# Patient Record
Sex: FEMALE | Race: WHITE | NOT HISPANIC OR LATINO | Employment: OTHER | URBAN - METROPOLITAN AREA
[De-identification: names, ages, dates, MRNs, and addresses within clinical notes are randomized per-mention and may not be internally consistent; named-entity substitution may affect disease eponyms.]

---

## 2018-10-04 ENCOUNTER — TRANSCRIBE ORDERS (OUTPATIENT)
Dept: ADMINISTRATIVE | Facility: HOSPITAL | Age: 58
End: 2018-10-04

## 2018-10-04 DIAGNOSIS — I73.9 CLAUDICATION (HCC): Primary | ICD-10-CM

## 2018-10-16 ENCOUNTER — HOSPITAL ENCOUNTER (OUTPATIENT)
Dept: CARDIOLOGY | Facility: HOSPITAL | Age: 58
Discharge: HOME | End: 2018-10-16
Attending: FAMILY MEDICINE | Admitting: FAMILY MEDICINE

## 2018-10-16 DIAGNOSIS — I73.9 CLAUDICATION (HCC): ICD-10-CM

## 2018-10-16 LAB
BH CV LOWER ARTERIAL LEFT ABI RATIO: 124
BH CV LOWER ARTERIAL LEFT DORSALIS PEDIS SYS MAX: 152 MMHG
BH CV LOWER ARTERIAL LEFT HIGH THIGH SYS MAX: 184 MMHG
BH CV LOWER ARTERIAL LEFT LOW THIGH SYS MAX: 165 MMHG
BH CV LOWER ARTERIAL LEFT POPLITEAL SYS MAX: 164 MMHG
BH CV LOWER ARTERIAL LEFT POST TIBIAL SYS MAX: 153 MMHG
BH CV LOWER ARTERIAL LEFT TBI RATIO: 1.01
BH CV LOWER ARTERIAL RIGHT ABI RATIO: 1.17
BH CV LOWER ARTERIAL RIGHT DORSALIS PEDIS SYS MAX: 144 MMHG
BH CV LOWER ARTERIAL RIGHT HIGH THIGH SYS MAX: 172 MMHG
BH CV LOWER ARTERIAL RIGHT LOW THIGH SYS MAX: 168 MMHG
BH CV LOWER ARTERIAL RIGHT POPLITEAL SYS MAX: 162 MMHG
BH CV LOWER ARTERIAL RIGHT POST TIBIAL SYS MAX: 144 MMHG
BH CV LOWER ARTERIAL RIGHT TBI RATIO: 1.02
UPPER ARTERIAL LEFT ARM BRACHIAL SYS MAX: 123 MMHG
UPPER ARTERIAL RIGHT ARM BRACHIAL SYS MAX: 123 MMHG

## 2018-10-16 PROCEDURE — 93923 UPR/LXTR ART STDY 3+ LVLS: CPT

## 2018-10-16 PROCEDURE — 93923 UPR/LXTR ART STDY 3+ LVLS: CPT | Performed by: INTERNAL MEDICINE

## 2019-12-23 ENCOUNTER — TRANSCRIBE ORDERS (OUTPATIENT)
Dept: ADMINISTRATIVE | Facility: HOSPITAL | Age: 59
End: 2019-12-23

## 2019-12-23 ENCOUNTER — HOSPITAL ENCOUNTER (OUTPATIENT)
Dept: GENERAL RADIOLOGY | Facility: HOSPITAL | Age: 59
Discharge: HOME | End: 2019-12-23
Admitting: NURSE PRACTITIONER

## 2019-12-23 DIAGNOSIS — N20.0 LEFT NEPHROLITHIASIS: Primary | ICD-10-CM

## 2019-12-23 DIAGNOSIS — N20.0 LEFT NEPHROLITHIASIS: ICD-10-CM

## 2019-12-23 PROCEDURE — 74018 RADEX ABDOMEN 1 VIEW: CPT

## 2020-05-11 ENCOUNTER — LAB (OUTPATIENT)
Dept: LAB | Facility: HOSPITAL | Age: 60
End: 2020-05-11

## 2020-05-11 ENCOUNTER — TRANSCRIBE ORDERS (OUTPATIENT)
Dept: LAB | Facility: HOSPITAL | Age: 60
End: 2020-05-11

## 2020-05-11 DIAGNOSIS — R94.6 NONSPECIFIC ABNORMAL RESULTS OF THYROID FUNCTION STUDY: ICD-10-CM

## 2020-05-11 DIAGNOSIS — R73.09 IMPAIRED GLUCOSE TOLERANCE TEST: ICD-10-CM

## 2020-05-11 DIAGNOSIS — R73.09 IMPAIRED GLUCOSE TOLERANCE TEST: Primary | ICD-10-CM

## 2020-05-11 LAB
ALBUMIN SERPL-MCNC: 4.5 G/DL (ref 3.5–5.2)
ALBUMIN/GLOB SERPL: 1.7 G/DL
ALP SERPL-CCNC: 68 U/L (ref 39–117)
ALT SERPL W P-5'-P-CCNC: 16 U/L (ref 1–33)
ANION GAP SERPL CALCULATED.3IONS-SCNC: 11.8 MMOL/L (ref 5–15)
AST SERPL-CCNC: 22 U/L (ref 1–32)
BILIRUB SERPL-MCNC: 0.4 MG/DL (ref 0.2–1.2)
BUN BLD-MCNC: 15 MG/DL (ref 6–20)
BUN/CREAT SERPL: 20.5 (ref 7–25)
CALCIUM SPEC-SCNC: 10.5 MG/DL (ref 8.6–10.5)
CHLORIDE SERPL-SCNC: 98 MMOL/L (ref 98–107)
CO2 SERPL-SCNC: 28.2 MMOL/L (ref 22–29)
CREAT BLD-MCNC: 0.73 MG/DL (ref 0.57–1)
GFR SERPL CREATININE-BSD FRML MDRD: 82 ML/MIN/1.73
GLOBULIN UR ELPH-MCNC: 2.6 GM/DL
GLUCOSE BLD-MCNC: 98 MG/DL (ref 65–99)
HBA1C MFR BLD: 5.4 % (ref 4.8–5.6)
POTASSIUM BLD-SCNC: 4.6 MMOL/L (ref 3.5–5.2)
PROT SERPL-MCNC: 7.1 G/DL (ref 6–8.5)
SODIUM BLD-SCNC: 138 MMOL/L (ref 136–145)
T4 FREE SERPL-MCNC: 1.18 NG/DL (ref 0.93–1.7)
TSH SERPL DL<=0.05 MIU/L-ACNC: 3.39 UIU/ML (ref 0.27–4.2)

## 2020-05-11 PROCEDURE — 36415 COLL VENOUS BLD VENIPUNCTURE: CPT

## 2020-05-11 PROCEDURE — 83036 HEMOGLOBIN GLYCOSYLATED A1C: CPT

## 2020-05-11 PROCEDURE — 84443 ASSAY THYROID STIM HORMONE: CPT

## 2020-05-11 PROCEDURE — 80053 COMPREHEN METABOLIC PANEL: CPT

## 2020-05-11 PROCEDURE — 84439 ASSAY OF FREE THYROXINE: CPT

## 2021-03-24 ENCOUNTER — OFFICE VISIT (OUTPATIENT)
Dept: ENDOCRINOLOGY | Facility: CLINIC | Age: 61
End: 2021-03-24

## 2021-03-24 VITALS
TEMPERATURE: 97.3 F | BODY MASS INDEX: 31.16 KG/M2 | HEART RATE: 76 BPM | OXYGEN SATURATION: 98 % | DIASTOLIC BLOOD PRESSURE: 78 MMHG | HEIGHT: 65 IN | WEIGHT: 187 LBS | SYSTOLIC BLOOD PRESSURE: 122 MMHG

## 2021-03-24 DIAGNOSIS — R73.03 PREDIABETES: ICD-10-CM

## 2021-03-24 DIAGNOSIS — R94.6 ABNORMAL THYROID FUNCTION TEST: Primary | ICD-10-CM

## 2021-03-24 PROCEDURE — 99203 OFFICE O/P NEW LOW 30 MIN: CPT | Performed by: INTERNAL MEDICINE

## 2021-03-24 RX ORDER — LISINOPRIL AND HYDROCHLOROTHIAZIDE 25; 20 MG/1; MG/1
1 TABLET ORAL DAILY
COMMUNITY
Start: 2021-01-22 | End: 2022-01-26

## 2021-03-24 RX ORDER — ASPIRIN 81 MG/1
81 TABLET ORAL DAILY
COMMUNITY

## 2021-03-24 RX ORDER — LINACLOTIDE 290 UG/1
290 CAPSULE, GELATIN COATED ORAL DAILY
COMMUNITY
Start: 2021-02-27

## 2021-03-24 RX ORDER — DULOXETIN HYDROCHLORIDE 60 MG/1
60 CAPSULE, DELAYED RELEASE ORAL DAILY
COMMUNITY
Start: 2021-02-25

## 2021-03-24 RX ORDER — SPIRONOLACTONE 50 MG/1
50 TABLET, FILM COATED ORAL DAILY
COMMUNITY
Start: 2021-03-06

## 2021-03-24 RX ORDER — DICLOFENAC SODIUM 75 MG/1
75 TABLET, DELAYED RELEASE ORAL 2 TIMES DAILY
COMMUNITY
End: 2021-03-24

## 2021-03-24 RX ORDER — DEXLANSOPRAZOLE 60 MG/1
60 CAPSULE, DELAYED RELEASE ORAL DAILY
COMMUNITY
Start: 2021-03-05

## 2021-03-24 NOTE — PROGRESS NOTES
"     Office Note      Date: 2021  Patient Name: Sarah Sanchez  MRN: [id number]  : 1960    Chief Complaint   Patient presents with   • Thyroid Problem       History of Present Illness:   Sarah Sanchez is a 60 y.o. female who presents for Thyroid Problem    It has been a year since her last visit. She isn't taking any thyroid meds. She denies any excess iodine intake. She hasn't noted any change in the size of her neck. She denies any compressive sxs. She denies any symptoms of hypo- or hyperthyroidism at this time, aside from some temperature dysregulation.     She isn't taking any DM meds. She hasn't had any episodes of hypoglycemia recently. She isn't doing FSBS.    She remains on biotin supplement daily. She remains on spironolactone.    She is no longer on WeightWatchers.  She has gained about 10 pounds recently.  She has started back to the gym.    Subjective      Review of Systems:   Review of Systems   Constitutional: Negative.    Cardiovascular: Negative.    Gastrointestinal: Negative.    Endocrine: Negative.        The following portions of the patient's history were reviewed and updated as appropriate: allergies, current medications, past family history, past medical history, past social history, past surgical history and problem list.    Objective     Visit Vitals  /78 (BP Location: Right arm, Patient Position: Sitting, Cuff Size: Adult)   Pulse 76   Temp 97.3 °F (36.3 °C) (Infrared)   Ht 165.1 cm (65\")   Wt 84.8 kg (187 lb)   SpO2 98%   BMI 31.12 kg/m²       Physical Exam:  Physical Exam  Constitutional:       Appearance: Normal appearance.   Neck:      Thyroid: No thyroid mass, thyromegaly or thyroid tenderness.   Lymphadenopathy:      Cervical: No cervical adenopathy.   Neurological:      Mental Status: She is alert.         Labs:    TSH  No results found for: TSHBASE     Free T4  Free T4   Date Value Ref Range Status   2020 1.18 0.93 - 1.70 ng/dL Final       T3  No " results found for: P8BSZCL      TPO  No results found for: THYROIDAB    TG AB  No results found for: THGAB    TG  No results found for: THYROGLB    CBC w/DIFF  No results found for: WBC, RBC, HGB, HCT, MCV, MCH, MCHC, RDW, RDWSD, MPV, PLT, NEUTRORELPCT, LYMPHORELPCT, MONORELPCT, EOSRELPCT, BASORELPCT, AUTOIGPER, NEUTROABS, LYMPHSABS, MONOSABS, EOSABS, BASOSABS, AUTOIGNUM, NRBC        Assessment / Plan      Assessment & Plan:  Diagnoses and all orders for this visit:    1. Abnormal thyroid function test (Primary)  Assessment & Plan:  She will stop biotin then get labs done several days later.    Orders:  -     T4, Free; Future  -     TSH; Future    2. Prediabetes  Assessment & Plan:  Will check A1c.  Continue to work on weight loss efforts.    Orders:  -     Hemoglobin A1c; Future  -     Comprehensive Metabolic Panel; Future      Return in about 1 year (around 3/24/2022) for Recheck with A1c, CMP, TSH, free T4.    Romero Gordon MD   03/24/2021

## 2021-03-29 ENCOUNTER — LAB (OUTPATIENT)
Dept: ENDOCRINOLOGY | Facility: CLINIC | Age: 61
End: 2021-03-29

## 2021-03-29 ENCOUNTER — LAB (OUTPATIENT)
Dept: LAB | Facility: HOSPITAL | Age: 61
End: 2021-03-29

## 2021-03-29 DIAGNOSIS — R94.6 ABNORMAL THYROID FUNCTION TEST: ICD-10-CM

## 2021-03-29 DIAGNOSIS — R73.03 PREDIABETES: ICD-10-CM

## 2021-03-29 LAB
ALBUMIN SERPL-MCNC: 4.4 G/DL (ref 3.5–5.2)
ALBUMIN/GLOB SERPL: 2.1 G/DL
ALP SERPL-CCNC: 82 U/L (ref 39–117)
ALT SERPL W P-5'-P-CCNC: 32 U/L (ref 1–33)
ANION GAP SERPL CALCULATED.3IONS-SCNC: 8 MMOL/L (ref 5–15)
AST SERPL-CCNC: 36 U/L (ref 1–32)
BILIRUB SERPL-MCNC: 0.2 MG/DL (ref 0–1.2)
BUN SERPL-MCNC: 17 MG/DL (ref 6–20)
BUN/CREAT SERPL: 20.5 (ref 7–25)
CALCIUM SPEC-SCNC: 9.5 MG/DL (ref 8.6–10.5)
CHLORIDE SERPL-SCNC: 102 MMOL/L (ref 98–107)
CO2 SERPL-SCNC: 28 MMOL/L (ref 22–29)
CREAT SERPL-MCNC: 0.83 MG/DL (ref 0.57–1)
GFR SERPL CREATININE-BSD FRML MDRD: 71 ML/MIN/1.73
GLOBULIN UR ELPH-MCNC: 2.1 GM/DL
GLUCOSE SERPL-MCNC: 93 MG/DL (ref 65–99)
HBA1C MFR BLD: 5.74 % (ref 4.8–5.6)
POTASSIUM SERPL-SCNC: 4.5 MMOL/L (ref 3.5–5.2)
PROT SERPL-MCNC: 6.5 G/DL (ref 6–8.5)
SODIUM SERPL-SCNC: 138 MMOL/L (ref 136–145)
T4 FREE SERPL-MCNC: 0.92 NG/DL (ref 0.93–1.7)
TSH SERPL DL<=0.05 MIU/L-ACNC: 6.13 UIU/ML (ref 0.27–4.2)

## 2021-03-29 PROCEDURE — 80053 COMPREHEN METABOLIC PANEL: CPT

## 2021-03-29 PROCEDURE — 84443 ASSAY THYROID STIM HORMONE: CPT

## 2021-03-29 PROCEDURE — 83036 HEMOGLOBIN GLYCOSYLATED A1C: CPT

## 2021-03-29 PROCEDURE — 84439 ASSAY OF FREE THYROXINE: CPT

## 2021-06-08 ENCOUNTER — LAB (OUTPATIENT)
Dept: LAB | Facility: HOSPITAL | Age: 61
End: 2021-06-08

## 2021-06-08 ENCOUNTER — OFFICE VISIT (OUTPATIENT)
Dept: ENDOCRINOLOGY | Facility: CLINIC | Age: 61
End: 2021-06-08

## 2021-06-08 VITALS
OXYGEN SATURATION: 99 % | HEIGHT: 65 IN | HEART RATE: 66 BPM | DIASTOLIC BLOOD PRESSURE: 74 MMHG | WEIGHT: 180 LBS | BODY MASS INDEX: 29.99 KG/M2 | SYSTOLIC BLOOD PRESSURE: 120 MMHG

## 2021-06-08 DIAGNOSIS — R94.6 ABNORMAL THYROID FUNCTION TEST: ICD-10-CM

## 2021-06-08 DIAGNOSIS — R73.03 PREDIABETES: ICD-10-CM

## 2021-06-08 DIAGNOSIS — R94.6 ABNORMAL THYROID FUNCTION TEST: Primary | ICD-10-CM

## 2021-06-08 LAB
ALBUMIN SERPL-MCNC: 4.5 G/DL (ref 3.5–5.2)
ALBUMIN/GLOB SERPL: 1.8 G/DL
ALP SERPL-CCNC: 80 U/L (ref 39–117)
ALT SERPL W P-5'-P-CCNC: 17 U/L (ref 1–33)
ANION GAP SERPL CALCULATED.3IONS-SCNC: 11.4 MMOL/L (ref 5–15)
AST SERPL-CCNC: 19 U/L (ref 1–32)
BILIRUB SERPL-MCNC: 0.3 MG/DL (ref 0–1.2)
BUN SERPL-MCNC: 15 MG/DL (ref 6–20)
BUN/CREAT SERPL: 20 (ref 7–25)
CALCIUM SPEC-SCNC: 9.7 MG/DL (ref 8.6–10.5)
CHLORIDE SERPL-SCNC: 100 MMOL/L (ref 98–107)
CO2 SERPL-SCNC: 27.6 MMOL/L (ref 22–29)
CREAT SERPL-MCNC: 0.75 MG/DL (ref 0.57–1)
GFR SERPL CREATININE-BSD FRML MDRD: 79 ML/MIN/1.73
GLOBULIN UR ELPH-MCNC: 2.5 GM/DL
GLUCOSE SERPL-MCNC: 92 MG/DL (ref 65–99)
HBA1C MFR BLD: 5.64 % (ref 4.8–5.6)
POTASSIUM SERPL-SCNC: 4.3 MMOL/L (ref 3.5–5.2)
PROT SERPL-MCNC: 7 G/DL (ref 6–8.5)
SODIUM SERPL-SCNC: 139 MMOL/L (ref 136–145)
T4 FREE SERPL-MCNC: 1.05 NG/DL (ref 0.93–1.7)
TSH SERPL DL<=0.05 MIU/L-ACNC: 4.5 UIU/ML (ref 0.27–4.2)

## 2021-06-08 PROCEDURE — 80053 COMPREHEN METABOLIC PANEL: CPT

## 2021-06-08 PROCEDURE — 84443 ASSAY THYROID STIM HORMONE: CPT

## 2021-06-08 PROCEDURE — 99213 OFFICE O/P EST LOW 20 MIN: CPT | Performed by: INTERNAL MEDICINE

## 2021-06-08 PROCEDURE — 84439 ASSAY OF FREE THYROXINE: CPT

## 2021-06-08 PROCEDURE — 83036 HEMOGLOBIN GLYCOSYLATED A1C: CPT

## 2021-06-08 RX ORDER — CALCIUM CARBONATE/VITAMIN D3 600 MG-20
1 TABLET ORAL DAILY
COMMUNITY
Start: 2019-01-01 | End: 2022-01-26

## 2021-06-08 RX ORDER — MULTIPLE VITAMINS W/ MINERALS TAB 9MG-400MCG
1 TAB ORAL DAILY
COMMUNITY
Start: 1986-01-01 | End: 2022-01-26

## 2021-06-08 NOTE — PROGRESS NOTES
"     Office Note      Date: 2021  Patient Name: Sarah Sanchez  MRN: [id number]  : 1960    Chief Complaint   Patient presents with   • Follow-up   • Thyroid Problem       History of Present Illness:   Sarah Sanchez is a 60 y.o. female who presents for Follow-up and Thyroid Problem    At the last visit the TSH was mildly elevated and free T4 was mildly low.  She isn't taking any thyroid meds. She denies any excess iodine intake. She hasn't noted any change in the size of her neck. She denies any compressive sxs. She denies any symptoms of hypo- or hyperthyroidism at this time, aside from some temperature dysregulation.      She isn't taking any DM meds. She hasn't had any episodes of hypoglycemia recently. She isn't doing FSBS.     She remains on biotin supplement daily but stopped it 2 weeks ago. She remains on spironolactone.     She has started back to the gym.  She has been able to lose some weight.  She has started using [employer] shira to help with weight loss.    She had basal cell cancer removed from left cheek.    Subjective      Review of Systems:   Review of Systems   Constitutional: Negative.    Cardiovascular: Negative.    Gastrointestinal: Negative.    Endocrine: Negative.        The following portions of the patient's history were reviewed and updated as appropriate: allergies, current medications, past family history, past medical history, past social history, past surgical history and problem list.    Objective     Visit Vitals  /74   Pulse 66   Ht 165.1 cm (65\")   Wt 81.6 kg (180 lb)   SpO2 99%   BMI 29.95 kg/m²       Physical Exam:  Physical Exam  Constitutional:       Appearance: Normal appearance.   Neck:      Thyroid: No thyroid mass, thyromegaly or thyroid tenderness.   Lymphadenopathy:      Cervical: No cervical adenopathy.   Neurological:      Mental Status: She is alert.         Labs:    TSH  No results found for: TSHBASE     Free T4  Free T4   Date Value Ref Range Status "   03/29/2021 0.92 (L) 0.93 - 1.70 ng/dL Final       T3  No results found for: G8PQVGN      TPO  No results found for: THYROIDAB    TG AB  No results found for: THGAB    TG  No results found for: THYROGLB    CBC w/DIFF  No results found for: WBC, RBC, HGB, HCT, MCV, MCH, MCHC, RDW, RDWSD, MPV, PLT, NEUTRORELPCT, LYMPHORELPCT, MONORELPCT, EOSRELPCT, BASORELPCT, AUTOIGPER, NEUTROABS, LYMPHSABS, MONOSABS, EOSABS, BASOSABS, AUTOIGNUM, NRBC        Assessment / Plan      Assessment & Plan:  Diagnoses and all orders for this visit:    1. Abnormal thyroid function test (Primary)  Assessment & Plan:  Check TFTs today.  Will send note about results.    Orders:  -     TSH; Future  -     T4, Free; Future    2. Prediabetes  Assessment & Plan:  Check A1c today.  Continue weight loss efforts.    Orders:  -     Hemoglobin A1c; Future  -     Comprehensive Metabolic Panel; Future      Return in about 3 months (around 9/8/2021) for Recheck with A1c, TSH, free T4.    Romero Gordon MD   06/08/2021

## 2021-10-25 ENCOUNTER — LAB (OUTPATIENT)
Dept: LAB | Facility: HOSPITAL | Age: 61
End: 2021-10-25

## 2021-10-25 ENCOUNTER — OFFICE VISIT (OUTPATIENT)
Dept: ENDOCRINOLOGY | Facility: CLINIC | Age: 61
End: 2021-10-25

## 2021-10-25 ENCOUNTER — DOCUMENTATION (OUTPATIENT)
Dept: DIABETES SERVICES | Facility: HOSPITAL | Age: 61
End: 2021-10-25

## 2021-10-25 VITALS
HEART RATE: 74 BPM | BODY MASS INDEX: 29.99 KG/M2 | SYSTOLIC BLOOD PRESSURE: 122 MMHG | OXYGEN SATURATION: 99 % | HEIGHT: 65 IN | DIASTOLIC BLOOD PRESSURE: 78 MMHG | WEIGHT: 180 LBS

## 2021-10-25 DIAGNOSIS — R94.6 ABNORMAL THYROID FUNCTION TEST: Primary | ICD-10-CM

## 2021-10-25 DIAGNOSIS — R73.03 PREDIABETES: ICD-10-CM

## 2021-10-25 DIAGNOSIS — R94.6 ABNORMAL THYROID FUNCTION TEST: ICD-10-CM

## 2021-10-25 LAB
HBA1C MFR BLD: 5.8 % (ref 4.8–5.6)
T4 FREE SERPL-MCNC: 1.27 NG/DL (ref 0.93–1.7)
TSH SERPL DL<=0.05 MIU/L-ACNC: 4.27 UIU/ML (ref 0.27–4.2)

## 2021-10-25 PROCEDURE — 84439 ASSAY OF FREE THYROXINE: CPT

## 2021-10-25 PROCEDURE — 83036 HEMOGLOBIN GLYCOSYLATED A1C: CPT

## 2021-10-25 PROCEDURE — 99214 OFFICE O/P EST MOD 30 MIN: CPT | Performed by: INTERNAL MEDICINE

## 2021-10-25 PROCEDURE — 84443 ASSAY THYROID STIM HORMONE: CPT

## 2021-10-25 RX ORDER — AMLODIPINE BESYLATE 5 MG/1
5 TABLET ORAL DAILY
COMMUNITY
Start: 2021-10-14

## 2021-10-25 RX ORDER — SEMAGLUTIDE 1.34 MG/ML
0.5 INJECTION, SOLUTION SUBCUTANEOUS WEEKLY
Qty: 1.5 ML | Refills: 5 | Status: SHIPPED | OUTPATIENT
Start: 2021-10-25 | End: 2023-02-07

## 2021-10-25 NOTE — ASSESSMENT & PLAN NOTE
Check A1c today.  Work on diet/exercise/weight loss.  We discussed a trial of ozempic today to see if the insurance covers this.

## 2021-10-25 NOTE — PLAN OF CARE
Sarah Sanchez was seen today for Ozempic teaching as a courtesy visit following her Endocrinology visit. We discussed Ozempic and how it works, side effects and administration. We discussed it was a weekly injection that starts at 0.25 for the first 4 weeks and at the 5th week, she will increase to 0.5. Patient was provided an Ozempic sample pack. She gave her first injection while in the office. She wrote the date on the enclosed card. She will call the office with any questions or concerns.

## 2021-10-25 NOTE — PROGRESS NOTES
"     Office Note      Date: 10/25/2021  Patient Name: Sarah Sanchez  MRN: [id number]  : 1960    Chief Complaint   Patient presents with   • Thyroid Problem       History of Present Illness:   Sarah Sanchez is a 61 y.o. female who presents for Thyroid Problem    She isn't taking any thyroid meds. She denies any excess iodine intake. She hasn't noted any change in the size of her neck. She denies any compressive sxs. She denies any symptoms of hypo- or hyperthyroidism at this time, aside from some temperature dysregulation.      She isn't taking any DM meds. She hasn't had any episodes of hypoglycemia recently. She isn't doing FSBS.  Her PCP prescribed wegovy to help with weight loss but it was too expensive.       She remains on biotin supplement daily. She remains on spironolactone.    She has been started on medication for Reynaud's phenomenon.  Her toes are still turning blue.  She is being referred to a rheumatologist.    Subjective      Review of Systems:   Review of Systems   Constitutional: Negative.    Cardiovascular: Negative.    Gastrointestinal: Negative.    Endocrine: Negative.        The following portions of the patient's history were reviewed and updated as appropriate: allergies, current medications, past family history, past medical history, past social history, past surgical history and problem list.    Objective     Visit Vitals  /78   Pulse 74   Ht 165.1 cm (65\")   Wt 81.6 kg (180 lb)   SpO2 99%   BMI 29.95 kg/m²       Physical Exam:  Physical Exam  Constitutional:       Appearance: Normal appearance.   Neurological:      Mental Status: She is alert.         Labs:    TSH  No results found for: TSHBASE     Free T4  Free T4   Date Value Ref Range Status   2021 1.05 0.93 - 1.70 ng/dL Final       T3  No results found for: E3DHVOH      TPO  No results found for: THYROIDAB    TG AB  No results found for: THGAB    TG  No results found for: THYROGLB    CBC w/DIFF  No results " found for: WBC, RBC, HGB, HCT, MCV, MCH, MCHC, RDW, RDWSD, MPV, PLT, NEUTRORELPCT, LYMPHORELPCT, MONORELPCT, EOSRELPCT, BASORELPCT, AUTOIGPER, NEUTROABS, LYMPHSABS, MONOSABS, EOSABS, BASOSABS, AUTOIGNUM, NRBC        Assessment / Plan      Assessment & Plan:  Diagnoses and all orders for this visit:    1. Abnormal thyroid function test (Primary)  Assessment & Plan:  Check TFTs today.    Orders:  -     TSH; Future  -     T4, Free; Future    2. Prediabetes  Assessment & Plan:  Check A1c today.  Work on diet/exercise/weight loss.  We discussed a trial of ozempic today to see if the insurance covers this.      Orders:  -     Hemoglobin A1c; Future    Other orders  -     Semaglutide,0.25 or 0.5MG/DOS, (Ozempic, 0.25 or 0.5 MG/DOSE,) 2 MG/1.5ML solution pen-injector; Inject 0.5 mg under the skin into the appropriate area as directed 1 (One) Time Per Week.  Dispense: 1.5 mL; Refill: 5      Return in about 3 months (around 1/25/2022) for Recheck with A1c, TSH, free T4, CMP.    Romero Gordon MD   10/25/2021

## 2021-10-27 ENCOUNTER — PRIOR AUTHORIZATION (OUTPATIENT)
Dept: ENDOCRINOLOGY | Facility: CLINIC | Age: 61
End: 2021-10-27

## 2021-10-27 NOTE — TELEPHONE ENCOUNTER
VEDA WANG (Key: [id number])  Rx #: [id number]  Ozempic (0.25 or 0.5 MG/DOSE) 2MG/1.5ML pen-injectors     Form  [facility] Electronic PA Form (2017 NCPDP)  Created  2 days ago  Sent to Plan  1 day ago  Plan Response  1 day ago  Submit Clinical Questions  1 day ago  Determination  Unfavorable  5 hours ago  Message from Plan  Your PA request has been denied. Additional information will be provided in the denial communication. (Message 1140) Your PA request has been denied. Additional information will be provided in the denial communication. (Message 1140)

## 2021-12-21 ENCOUNTER — TRANSCRIBE ORDERS (OUTPATIENT)
Dept: ADMINISTRATIVE | Facility: HOSPITAL | Age: 61
End: 2021-12-21

## 2021-12-21 DIAGNOSIS — R79.89 ELEVATED PLASMA METANEPHRINES: Primary | ICD-10-CM

## 2021-12-22 ENCOUNTER — HOSPITAL ENCOUNTER (OUTPATIENT)
Dept: CT IMAGING | Facility: HOSPITAL | Age: 61
Discharge: HOME | End: 2021-12-22
Admitting: INTERNAL MEDICINE

## 2021-12-22 DIAGNOSIS — R79.89 ELEVATED PLASMA METANEPHRINES: ICD-10-CM

## 2021-12-22 LAB — CREAT BLDA-MCNC: 0.8 MG/DL (ref 0.6–1.3)

## 2021-12-22 PROCEDURE — 25010000002 IOPAMIDOL 61 % SOLUTION: Performed by: INTERNAL MEDICINE

## 2021-12-22 PROCEDURE — 82565 ASSAY OF CREATININE: CPT

## 2021-12-22 PROCEDURE — 74177 CT ABD & PELVIS W/CONTRAST: CPT

## 2021-12-22 RX ADMIN — IOPAMIDOL 90 ML: 612 INJECTION, SOLUTION INTRAVENOUS at 16:18

## 2021-12-27 ENCOUNTER — OFFICE VISIT (OUTPATIENT)
Dept: ENDOCRINOLOGY | Facility: CLINIC | Age: 61
End: 2021-12-27

## 2021-12-27 VITALS
BODY MASS INDEX: 26.99 KG/M2 | OXYGEN SATURATION: 98 % | HEIGHT: 65 IN | DIASTOLIC BLOOD PRESSURE: 81 MMHG | HEART RATE: 82 BPM | WEIGHT: 162 LBS | SYSTOLIC BLOOD PRESSURE: 118 MMHG

## 2021-12-27 DIAGNOSIS — R73.03 PREDIABETES: Primary | ICD-10-CM

## 2021-12-27 DIAGNOSIS — R94.6 ABNORMAL THYROID FUNCTION TEST: ICD-10-CM

## 2021-12-27 PROCEDURE — 99214 OFFICE O/P EST MOD 30 MIN: CPT | Performed by: INTERNAL MEDICINE

## 2021-12-27 RX ORDER — BLOOD-GLUCOSE METER
KIT MISCELLANEOUS
COMMUNITY
Start: 2021-12-14 | End: 2023-02-07

## 2021-12-27 RX ORDER — AMLODIPINE BESYLATE 10 MG/1
TABLET ORAL
COMMUNITY
Start: 2021-10-25 | End: 2022-01-26

## 2021-12-27 RX ORDER — BLOOD SUGAR DIAGNOSTIC
STRIP MISCELLANEOUS
COMMUNITY
Start: 2021-12-13 | End: 2023-02-07

## 2021-12-27 RX ORDER — LANCETS
EACH MISCELLANEOUS
COMMUNITY
Start: 2021-12-17 | End: 2023-02-07

## 2021-12-27 NOTE — PROGRESS NOTES
"     Office Note      Date: 2021  Patient Name: Sarah Sanchez  MRN: [id number]  : 1960    Chief Complaint   Patient presents with   • Thyroid Problem       History of Present Illness:   Sarah Sanchez is a 61 year old woman.    She isn't taking any thyroid meds. She denies any excess iodine intake. She hasn't noted any change in the size of her neck. She denies any compressive sxs. She denies any symptoms of hypo- or hyperthyroidism at this time, aside from some temperature dysregulation.      She isn't taking any DM meds. She hasn't had any episodes of hypoglycemia recently. She isn't doing FSBS.  She has been taking ozempic 0.25mg q week.   She stopped the biotin. She remains on spironolactone.    She saw Dr Benz recently.  They did labs.  The normetanephrine was elevated.  Metanephrine was normal.  She had CT that apparently showed normal adrenals.      She is on betablocker for Raynaud's.      She has been experiencing waves of nausea and vomiting.  This predates the ozempic.    Subjective     Review of Systems:   Review of Systems   Constitutional: Negative.    Cardiovascular: Negative.    Gastrointestinal: Positive for nausea and vomiting.   Endocrine: Positive for heat intolerance.       The following portions of the patient's history were reviewed and updated as appropriate: allergies, current medications, past family history, past medical history, past social history, past surgical history and problem list.    Objective       Visit Vitals  /81   Pulse 82   Ht 165.1 cm (65\")   Wt 73.5 kg (162 lb)   SpO2 98%   BMI 26.96 kg/m²       Physical Exam:  Physical Exam  Constitutional:       Appearance: Normal appearance.   Neurological:      Mental Status: She is alert.         Labs:    HbA1c  Lab Results   Component Value Date    HGBA1C 5.80 (H) 10/25/2021       CMP  Lab Results   Component Value Date    GLUCOSE 92 2021    BUN 15 2021    CREATININE 0.80 2021    EGFRIFNONA " 79 06/08/2021    BCR 20.0 06/08/2021    K 4.3 06/08/2021    CO2 27.6 06/08/2021    CALCIUM 9.7 06/08/2021    AST 19 06/08/2021    ALT 17 06/08/2021        Lipid Panel        TSH  Lab Results   Component Value Date    TSH 4.270 (H) 10/25/2021    FREET4 1.27 10/25/2021        Hemoglobin A1C  Lab Results   Component Value Date    HGBA1C 5.80 (H) 10/25/2021        Microalbumin/Creatinine  No results found for: MALBCRERATIO, CREATINIURIN, MICROALBUR        Assessment / Plan      Assessment & Plan:  Diagnoses and all orders for this visit:    1. Prediabetes (Primary)  Assessment & Plan:  Last A1c okay.  Continue ozempic but stay on lower dose since she has n/v.  We discussed seeing GI again about the n/v.      2. Abnormal thyroid function test  Assessment & Plan:  Recent TSH was again mildly elevated.  Free T4 is normal.  No goiter on exam.  Continue observation for now.        Return in about 3 months (around 3/27/2022) for Recheck with A1c, TSH, free T4, CMP.    Romero Gordon MD   12/27/2021

## 2021-12-27 NOTE — ASSESSMENT & PLAN NOTE
Last A1c okay.  Continue ozempic but stay on lower dose since she has n/v.  We discussed seeing GI again about the n/v.

## 2021-12-27 NOTE — ASSESSMENT & PLAN NOTE
Recent TSH was again mildly elevated.  Free T4 is normal.  No goiter on exam.  Continue observation for now.

## 2022-01-26 ENCOUNTER — OFFICE VISIT (OUTPATIENT)
Dept: CARDIOLOGY | Facility: CLINIC | Age: 62
End: 2022-01-26

## 2022-01-26 VITALS — HEIGHT: 65 IN | BODY MASS INDEX: 26.66 KG/M2 | OXYGEN SATURATION: 96 % | WEIGHT: 160 LBS

## 2022-01-26 DIAGNOSIS — R07.2 PRECORDIAL PAIN: ICD-10-CM

## 2022-01-26 DIAGNOSIS — R42 DIZZINESS: Primary | ICD-10-CM

## 2022-01-26 PROCEDURE — 93000 ELECTROCARDIOGRAM COMPLETE: CPT | Performed by: INTERNAL MEDICINE

## 2022-01-26 PROCEDURE — 99204 OFFICE O/P NEW MOD 45 MIN: CPT | Performed by: INTERNAL MEDICINE

## 2022-01-26 NOTE — PROGRESS NOTES
[facility] Cardiology  Consultation H&P  Sarah Sanchez  1960  [phone]  There is no work phone number on file..    VISIT DATE:  01/26/2022    PCP: [facility]z [facility]  [id number] [address] [place] [id number]    CC:  Chief Complaint   Patient presents with   • Chest Pain     consult   • Shortness of Breath   • Dizziness   • Migraine   • Nausea       ASSESSMENT:   Diagnosis Plan   1. Dizziness  Adult Transthoracic Echo Complete W/ Cont if Necessary Per Protocol    Tilt Table   2. Precordial pain  Stress Test With Myocardial Perfusion (1 Day)         PLAN:  -Transthoracic echocardiogram to assess underlying myocardial structure and function  -Tilt table test  -Exercise myocardial perfusion imaging for ischemia evaluation  -Wean off lisinopril hydrochlorothiazide.  Continue keep home blood pressure log.  Avoid dehydration.  Significant orthostatic hypotension only occurs in about 1% of patients on Ozempic.    History of Present Illness   61-year-old female presenting with recurrent presyncope, exertional chest discomfort and nausea over the previous 3 to 4 months.  She was started on Ozempic back in August and has lost about 30 pounds.  The previous 2 months she has had episodes of exertional nausea and vomiting.  Also with episodes of tachypalpitations.  Intermittently has a sensation of chest heaviness which can intermittently be associated with exertion in a class II pattern.  Has episodes in which she feels lightheaded if she stands up from a seated position or if she is standing for prolonged period of time in the same position.  She has had episodes of presyncope but no nicola syncope.  Blood pressures currently running less than 120/80 mmHg home.  Systolic blood pressures running in the high 90s today.  Blood pressures were stable during orthostatics but there was a 22 bpm increase in her baseline heart rate when compared with lying and standing.  She reports  "keeping up with her fluid intake.  Free normetanephrine was elevated at 534 pg/mL.  Follow-up CT abdomen pelvis was negative for adrenal adenoma.  She has had an unremarkable 24-hour Holter monitor.    PHYSICAL EXAMINATION:  Vitals:    01/26/22 1509 01/26/22 1517 01/26/22 1518   SpO2: 99% 98% 96%   Weight: 72.6 kg (160 lb)     Height: 165.1 cm (65\")       General Appearance:    Alert, cooperative, no distress, appears stated age   Head:    Normocephalic, without obvious abnormality, atraumatic   Eyes:    conjunctiva/corneas clear, EOM's intact, fundi     benign, both eyes   Ears:    Normal TM's and external ear canals, both ears   Nose:   Nares normal, septum midline, mucosa normal, no drainage    or sinus tenderness   Throat:   Lips, mucosa, and tongue normal; teeth and gums normal   Neck:   Supple, symmetrical, trachea midline, no adenopathy;     thyroid:  no enlargement/tenderness/nodules; no carotid    bruit or JVD   Back:     Symmetric, no curvature, ROM normal, no CVA tenderness   Lungs:     Clear to auscultation bilaterally, respirations unlabored   Chest Wall:    No tenderness or deformity    Heart:    Regular rate and rhythm, S1 and S2 normal, no murmur, rub   or gallop, normal carotid impulse bilaterally without bruit.   Abdomen:     Soft, non-tender, bowel sounds active all four quadrants,     no masses, no organomegaly   Extremities:   Extremities normal, atraumatic, no cyanosis or edema   Pulses:   2+ and symmetric all extremities   Skin:   Skin color, texture, turgor normal, no rashes or lesions   Lymph nodes:   Cervical, supraclavicular, and axillary nodes normal   Neurologic:   normal strength, sensation intact     throughout       Diagnostic Data:    ECG 12 Lead    Date/Time: 1/26/2022 3:24 PM  Performed by: Tanmay Sargent III, MD  Authorized by: Tanmay Sargent III, MD   Comparison: not compared with previous ECG   Previous ECG: no previous ECG available  Rhythm: sinus rhythm  Other findings: low " voltage and poor R wave progression    Clinical impression: abnormal EKG          No results found for: CHLPL, TRIG, HDL, LDLDIRECT  Lab Results   Component Value Date    GLUCOSE 92 06/08/2021    BUN 15 06/08/2021    CREATININE 0.80 12/22/2021     06/08/2021    K 4.3 06/08/2021     06/08/2021    CO2 27.6 06/08/2021     Lab Results   Component Value Date    HGBA1C 5.80 (H) 10/25/2021     No results found for: WBC, HGB, HCT, PLT    PROBLEM LIST:  Patient Active Problem List   Diagnosis   • Prediabetes   • Abnormal thyroid function test       PAST MEDICAL HX  Past Medical History:   Diagnosis Date   • Cancer (HCC) 2020    Skin   • Hypertension 2016   • Impaired glucose tolerance    • Raised TSH level        Allergies  Allergies   Allergen Reactions   • Penicillins Rash       Current Medications    Current Outpatient Medications:   •  amLODIPine (NORVASC) 5 MG tablet, 5 mg Daily., Disp: , Rfl:   •  aspirin 81 MG EC tablet, Take 81 mg by mouth Daily., Disp: , Rfl:   •  Blood Glucose Monitoring Suppl (OneTouch Verio Reflect) w/Device kit, , Disp: , Rfl:   •  Dexilant 60 MG capsule, 60 mg Daily., Disp: , Rfl:   •  DULoxetine (CYMBALTA) 60 MG capsule, 60 mg Daily., Disp: , Rfl:   •  Lancets (onetouch ultrasoft) lancets, , Disp: , Rfl:   •  Linzess 290 MCG capsule capsule, 290 mcg Daily., Disp: , Rfl:   •  OneTouch Verio test strip, , Disp: , Rfl:   •  Semaglutide,0.25 or 0.5MG/DOS, (Ozempic, 0.25 or 0.5 MG/DOSE,) 2 MG/1.5ML solution pen-injector, Inject 0.5 mg under the skin into the appropriate area as directed 1 (One) Time Per Week., Disp: 1.5 mL, Rfl: 5  •  spironolactone (ALDACTONE) 50 MG tablet, 50 mg Daily., Disp: , Rfl:          ROS  ROS  All other body systems reviewed and are negative    SOCIAL HX  Social History     Socioeconomic History   • Marital status:    Tobacco Use   • Smoking status: Never Smoker   • Smokeless tobacco: Never Used   Vaping Use   • Vaping Use: Never used   Substance and  Sexual Activity   • Alcohol use: Yes     Alcohol/week: 1.0 standard drink     Types: 1 Glasses of wine per week     Comment: Monthly if that   • Drug use: Never   • Sexual activity: Not Currently     Partners: Male     Birth control/protection: Condom     Comment: Hysterectomy       FAMILY HX  Family History   Problem Relation Age of Onset   • Cancer Mother    • Hypertension Father            • Heart disease Father    • Heart attack Father            • Diabetes Brother    • Hypertension Brother    • Obesity Brother    • Diabetes Brother    • Hypertension Brother              Tanmay Sargent III, MD, FACC

## 2022-03-03 ENCOUNTER — DOCUMENTATION (OUTPATIENT)
Dept: ENDOCRINOLOGY | Facility: CLINIC | Age: 62
End: 2022-03-03

## 2022-03-03 NOTE — PROGRESS NOTES
Patient is eligible to fill with [facility].    [facility]  Ozempic-needs PA she's been taking samples- applied for PA on 3/3    April Jatin Mac  Pharmacy Care Coordinator  3/3/2022  12:25 EST

## 2022-03-04 ENCOUNTER — HOSPITAL ENCOUNTER (OUTPATIENT)
Dept: CARDIOLOGY | Facility: HOSPITAL | Age: 62
Discharge: HOME | End: 2022-03-04

## 2022-03-04 VITALS
SYSTOLIC BLOOD PRESSURE: 124 MMHG | HEIGHT: 65 IN | BODY MASS INDEX: 25.82 KG/M2 | HEART RATE: 66 BPM | DIASTOLIC BLOOD PRESSURE: 84 MMHG | WEIGHT: 154.98 LBS

## 2022-03-04 VITALS
BODY MASS INDEX: 25.83 KG/M2 | HEIGHT: 65 IN | DIASTOLIC BLOOD PRESSURE: 85 MMHG | SYSTOLIC BLOOD PRESSURE: 116 MMHG | WEIGHT: 155 LBS

## 2022-03-04 VITALS
WEIGHT: 155 LBS | BODY MASS INDEX: 25.83 KG/M2 | HEART RATE: 67 BPM | HEIGHT: 65 IN | SYSTOLIC BLOOD PRESSURE: 100 MMHG | DIASTOLIC BLOOD PRESSURE: 74 MMHG

## 2022-03-04 DIAGNOSIS — R42 DIZZINESS: ICD-10-CM

## 2022-03-04 DIAGNOSIS — R07.2 PRECORDIAL PAIN: ICD-10-CM

## 2022-03-04 LAB
BH CV ECHO MEAS - AI DEC SLOPE: 180.4 CM/SEC^2
BH CV ECHO MEAS - AI MAX PG: 56.3 MMHG
BH CV ECHO MEAS - AI MAX VEL: 375.1 CM/SEC
BH CV ECHO MEAS - AI P1/2T: 608.9 MSEC
BH CV ECHO MEAS - AO MAX PG (FULL): 1 MMHG
BH CV ECHO MEAS - AO MAX PG: 5.1 MMHG
BH CV ECHO MEAS - AO MEAN PG (FULL): 0.97 MMHG
BH CV ECHO MEAS - AO MEAN PG: 2.7 MMHG
BH CV ECHO MEAS - AO ROOT AREA (BSA CORRECTED): 2.1
BH CV ECHO MEAS - AO ROOT AREA: 11.2 CM^2
BH CV ECHO MEAS - AO ROOT DIAM: 3.8 CM
BH CV ECHO MEAS - AO V2 MAX: 113.2 CM/SEC
BH CV ECHO MEAS - AO V2 MEAN: 76.6 CM/SEC
BH CV ECHO MEAS - AO V2 VTI: 24.1 CM
BH CV ECHO MEAS - ASC AORTA: 2.8 CM
BH CV ECHO MEAS - AVA(I,A): 2.9 CM^2
BH CV ECHO MEAS - AVA(I,D): 2.9 CM^2
BH CV ECHO MEAS - AVA(V,A): 2.9 CM^2
BH CV ECHO MEAS - AVA(V,D): 2.9 CM^2
BH CV ECHO MEAS - BSA(HAYCOCK): 1.8 M^2
BH CV ECHO MEAS - BSA: 1.8 M^2
BH CV ECHO MEAS - BZI_BMI: 25.8 KILOGRAMS/M^2
BH CV ECHO MEAS - BZI_METRIC_HEIGHT: 165.1 CM
BH CV ECHO MEAS - BZI_METRIC_WEIGHT: 70.3 KG
BH CV ECHO MEAS - EDV(CUBED): 59.3 ML
BH CV ECHO MEAS - EDV(MOD-SP2): 65 ML
BH CV ECHO MEAS - EDV(MOD-SP4): 69 ML
BH CV ECHO MEAS - EDV(TEICH): 65.9 ML
BH CV ECHO MEAS - EF(CUBED): 86.2 %
BH CV ECHO MEAS - EF(MOD-BP): 66 %
BH CV ECHO MEAS - EF(MOD-SP2): 61.5 %
BH CV ECHO MEAS - EF(MOD-SP4): 69.6 %
BH CV ECHO MEAS - EF(TEICH): 80.3 %
BH CV ECHO MEAS - ESV(CUBED): 8.2 ML
BH CV ECHO MEAS - ESV(MOD-SP2): 25 ML
BH CV ECHO MEAS - ESV(MOD-SP4): 21 ML
BH CV ECHO MEAS - ESV(TEICH): 13 ML
BH CV ECHO MEAS - FS: 48.3 %
BH CV ECHO MEAS - IVS/LVPW: 1
BH CV ECHO MEAS - IVSD: 1.1 CM
BH CV ECHO MEAS - LA DIMENSION: 2.5 CM
BH CV ECHO MEAS - LA/AO: 0.67
BH CV ECHO MEAS - LAD MAJOR: 4.7 CM
BH CV ECHO MEAS - LAT PEAK E' VEL: 12.3 CM/SEC
BH CV ECHO MEAS - LATERAL E/E' RATIO: 6.8
BH CV ECHO MEAS - LV DIASTOLIC VOL/BSA (35-75): 38.9 ML/M^2
BH CV ECHO MEAS - LV MASS(C)D: 130.4 GRAMS
BH CV ECHO MEAS - LV MASS(C)DI: 73.5 GRAMS/M^2
BH CV ECHO MEAS - LV MAX PG: 4.1 MMHG
BH CV ECHO MEAS - LV MEAN PG: 1.8 MMHG
BH CV ECHO MEAS - LV SYSTOLIC VOL/BSA (12-30): 11.8 ML/M^2
BH CV ECHO MEAS - LV V1 MAX: 101 CM/SEC
BH CV ECHO MEAS - LV V1 MEAN: 59.4 CM/SEC
BH CV ECHO MEAS - LV V1 VTI: 21.5 CM
BH CV ECHO MEAS - LVIDD: 3.9 CM
BH CV ECHO MEAS - LVIDS: 2 CM
BH CV ECHO MEAS - LVLD AP2: 7 CM
BH CV ECHO MEAS - LVLD AP4: 7.2 CM
BH CV ECHO MEAS - LVLS AP2: 5.6 CM
BH CV ECHO MEAS - LVLS AP4: 5.7 CM
BH CV ECHO MEAS - LVOT AREA (M): 3.1 CM^2
BH CV ECHO MEAS - LVOT AREA: 3.3 CM^2
BH CV ECHO MEAS - LVOT DIAM: 2 CM
BH CV ECHO MEAS - LVPWD: 1 CM
BH CV ECHO MEAS - MED PEAK E' VEL: 8.8 CM/SEC
BH CV ECHO MEAS - MEDIAL E/E' RATIO: 9.4
BH CV ECHO MEAS - MR MAX PG: 91 MMHG
BH CV ECHO MEAS - MR MAX VEL: 473.5 CM/SEC
BH CV ECHO MEAS - MV A MAX VEL: 71 CM/SEC
BH CV ECHO MEAS - MV DEC SLOPE: 336.1 CM/SEC^2
BH CV ECHO MEAS - MV DEC TIME: 0.18 SEC
BH CV ECHO MEAS - MV E MAX VEL: 84.2 CM/SEC
BH CV ECHO MEAS - MV E/A: 1.2
BH CV ECHO MEAS - MV P1/2T MAX VEL: 92.9 CM/SEC
BH CV ECHO MEAS - MV P1/2T: 81 MSEC
BH CV ECHO MEAS - MVA P1/2T LCG: 2.4 CM^2
BH CV ECHO MEAS - MVA(P1/2T): 2.7 CM^2
BH CV ECHO MEAS - PA ACC SLOPE: 513.2 CM/SEC^2
BH CV ECHO MEAS - PA ACC TIME: 0.11 SEC
BH CV ECHO MEAS - PA MAX PG: 3.3 MMHG
BH CV ECHO MEAS - PA PR(ACCEL): 29.1 MMHG
BH CV ECHO MEAS - PA V2 MAX: 89.9 CM/SEC
BH CV ECHO MEAS - PULM A REVS VEL: 30.1 CM/SEC
BH CV ECHO MEAS - PULM DIAS VEL: 50.9 CM/SEC
BH CV ECHO MEAS - PULM S/D: 1.1
BH CV ECHO MEAS - PULM SYS VEL: 55.9 CM/SEC
BH CV ECHO MEAS - RAP SYSTOLE: 3 MMHG
BH CV ECHO MEAS - RVSP: 22 MMHG
BH CV ECHO MEAS - SI(AO): 151.5 ML/M^2
BH CV ECHO MEAS - SI(CUBED): 28.8 ML/M^2
BH CV ECHO MEAS - SI(LVOT): 39.7 ML/M^2
BH CV ECHO MEAS - SI(MOD-SP2): 22.5 ML/M^2
BH CV ECHO MEAS - SI(MOD-SP4): 27 ML/M^2
BH CV ECHO MEAS - SI(TEICH): 29.8 ML/M^2
BH CV ECHO MEAS - SV(AO): 268.9 ML
BH CV ECHO MEAS - SV(CUBED): 51.1 ML
BH CV ECHO MEAS - SV(LVOT): 70.4 ML
BH CV ECHO MEAS - SV(MOD-SP2): 40 ML
BH CV ECHO MEAS - SV(MOD-SP4): 48 ML
BH CV ECHO MEAS - SV(TEICH): 52.9 ML
BH CV ECHO MEAS - TAPSE (>1.6): 2.4 CM
BH CV ECHO MEAS - TR MAX PG: 19 MMHG
BH CV ECHO MEAS - TR MAX VEL: 194.2 CM/SEC
BH CV ECHO MEASUREMENTS AVERAGE E/E' RATIO: 7.98
BH CV REST NUCLEAR ISOTOPE DOSE: 9.7 MCI
BH CV STRESS BP STAGE 1: NORMAL
BH CV STRESS BP STAGE 2: NORMAL
BH CV STRESS DURATION MIN STAGE 1: 3
BH CV STRESS DURATION MIN STAGE 2: 3
BH CV STRESS DURATION MIN STAGE 3: 1
BH CV STRESS DURATION SEC STAGE 1: 0
BH CV STRESS DURATION SEC STAGE 2: 0
BH CV STRESS DURATION SEC STAGE 3: 37
BH CV STRESS GRADE STAGE 1: 10
BH CV STRESS GRADE STAGE 2: 12
BH CV STRESS GRADE STAGE 3: 14
BH CV STRESS HR STAGE 1: 115
BH CV STRESS HR STAGE 2: 139
BH CV STRESS HR STAGE 3: 153
BH CV STRESS METS STAGE 1: 5
BH CV STRESS METS STAGE 2: 7.5
BH CV STRESS METS STAGE 3: 10
BH CV STRESS NUCLEAR ISOTOPE DOSE: 33 MCI
BH CV STRESS O2 STAGE 1: 98
BH CV STRESS O2 STAGE 2: 98
BH CV STRESS O2 STAGE 3: 97
BH CV STRESS PROTOCOL 1: NORMAL
BH CV STRESS RECOVERY BP: NORMAL MMHG
BH CV STRESS RECOVERY HR: 87 BPM
BH CV STRESS RECOVERY O2: 98 %
BH CV STRESS SPEED STAGE 1: 1.7
BH CV STRESS SPEED STAGE 2: 2.5
BH CV STRESS SPEED STAGE 3: 3.4
BH CV STRESS STAGE 1: 1
BH CV STRESS STAGE 2: 2
BH CV STRESS STAGE 3: 3
BH CV XLRA - RV BASE: 3.1 CM
BH CV XLRA - RV LENGTH: 5.8 CM
BH CV XLRA - RV MID: 2.6 CM
LEFT ATRIUM VOLUME INDEX: 25.4 ML/M^2
LEFT ATRIUM VOLUME: 45 ML
LV EF NUC BP: 83 %
MAXIMAL PREDICTED HEART RATE: 161 BPM
PERCENT MAX PREDICTED HR: 95.03 %
STRESS BASELINE BP: NORMAL MMHG
STRESS BASELINE HR: 60 BPM
STRESS O2 SAT REST: 99 %
STRESS PERCENT HR: 112 %
STRESS POST ESTIMATED WORKLOAD: 9.3 METS
STRESS POST EXERCISE DUR MIN: 7 MIN
STRESS POST EXERCISE DUR SEC: 37 SEC
STRESS POST O2 SAT PEAK: 97 %
STRESS POST PEAK BP: NORMAL MMHG
STRESS POST PEAK HR: 153 BPM
STRESS TARGET HR: 137 BPM

## 2022-03-04 PROCEDURE — 93018 CV STRESS TEST I&R ONLY: CPT | Performed by: INTERNAL MEDICINE

## 2022-03-04 PROCEDURE — 78452 HT MUSCLE IMAGE SPECT MULT: CPT | Performed by: INTERNAL MEDICINE

## 2022-03-04 PROCEDURE — 93306 TTE W/DOPPLER COMPLETE: CPT | Performed by: INTERNAL MEDICINE

## 2022-03-04 PROCEDURE — 93660 TILT TABLE EVALUATION: CPT

## 2022-03-04 PROCEDURE — 93306 TTE W/DOPPLER COMPLETE: CPT

## 2022-03-04 PROCEDURE — 0 TECHNETIUM SESTAMIBI: Performed by: INTERNAL MEDICINE

## 2022-03-04 PROCEDURE — A9500 TC99M SESTAMIBI: HCPCS | Performed by: INTERNAL MEDICINE

## 2022-03-04 PROCEDURE — 78452 HT MUSCLE IMAGE SPECT MULT: CPT

## 2022-03-04 PROCEDURE — 93660 TILT TABLE EVALUATION: CPT | Performed by: INTERNAL MEDICINE

## 2022-03-04 PROCEDURE — 93017 CV STRESS TEST TRACING ONLY: CPT

## 2022-03-04 RX ADMIN — TECHNETIUM TC 99M SESTAMIBI 1 DOSE: 1 INJECTION INTRAVENOUS at 08:55

## 2022-03-04 RX ADMIN — TECHNETIUM TC 99M SESTAMIBI 1 DOSE: 1 INJECTION INTRAVENOUS at 10:50

## 2022-03-07 ENCOUNTER — TELEPHONE (OUTPATIENT)
Dept: CARDIOLOGY | Facility: CLINIC | Age: 62
End: 2022-03-07

## 2022-03-07 RX ORDER — HYDROCHLOROTHIAZIDE 25 MG/1
25 TABLET ORAL DAILY
Qty: 30 TABLET | Refills: 3 | Status: SHIPPED | OUTPATIENT
Start: 2022-03-07 | End: 2022-07-05

## 2022-03-07 NOTE — TELEPHONE ENCOUNTER
----- Message from Tanmay Sargent III, MD sent at 3/7/2022 11:31 AM EST -----  Normal stress test, no concerning abnormalities noted on echo.

## 2022-03-07 NOTE — TELEPHONE ENCOUNTER
Notified of message above from . Wanted you to know that since you stopped her Lisinopril-HCTZ 20-25 mg that her hands and feet have been swollen. B/P has been averaging @ 119/72. No other symptoms reported.Please advise.

## 2022-03-25 ENCOUNTER — LAB (OUTPATIENT)
Dept: LAB | Facility: HOSPITAL | Age: 62
End: 2022-03-25

## 2022-03-25 ENCOUNTER — OFFICE VISIT (OUTPATIENT)
Dept: ENDOCRINOLOGY | Facility: CLINIC | Age: 62
End: 2022-03-25

## 2022-03-25 VITALS
OXYGEN SATURATION: 96 % | HEART RATE: 70 BPM | DIASTOLIC BLOOD PRESSURE: 68 MMHG | HEIGHT: 65 IN | WEIGHT: 150 LBS | BODY MASS INDEX: 24.99 KG/M2 | SYSTOLIC BLOOD PRESSURE: 122 MMHG

## 2022-03-25 DIAGNOSIS — R73.03 PREDIABETES: Primary | ICD-10-CM

## 2022-03-25 DIAGNOSIS — R94.6 ABNORMAL THYROID FUNCTION TEST: ICD-10-CM

## 2022-03-25 DIAGNOSIS — R73.03 PREDIABETES: ICD-10-CM

## 2022-03-25 LAB
ALBUMIN SERPL-MCNC: 5.1 G/DL (ref 3.5–5.2)
ALBUMIN/GLOB SERPL: 2.1 G/DL
ALP SERPL-CCNC: 59 U/L (ref 39–117)
ALT SERPL W P-5'-P-CCNC: 20 U/L (ref 1–33)
ANION GAP SERPL CALCULATED.3IONS-SCNC: 14 MMOL/L (ref 5–15)
AST SERPL-CCNC: 19 U/L (ref 1–32)
BILIRUB SERPL-MCNC: 0.3 MG/DL (ref 0–1.2)
BUN SERPL-MCNC: 20 MG/DL (ref 6–20)
BUN/CREAT SERPL: 24.7 (ref 7–25)
CALCIUM SPEC-SCNC: 10.4 MG/DL (ref 8.6–10.5)
CHLORIDE SERPL-SCNC: 96 MMOL/L (ref 98–107)
CO2 SERPL-SCNC: 28 MMOL/L (ref 22–29)
CREAT SERPL-MCNC: 0.81 MG/DL (ref 0.57–1)
EGFRCR SERPLBLD CKD-EPI 2021: 83.7 ML/MIN/1.73
EXPIRATION DATE: NORMAL
EXPIRATION DATE: NORMAL
GLOBULIN UR ELPH-MCNC: 2.4 GM/DL
GLUCOSE BLDC GLUCOMTR-MCNC: 89 MG/DL (ref 70–130)
GLUCOSE SERPL-MCNC: 62 MG/DL (ref 65–99)
HBA1C MFR BLD: 5 %
Lab: NORMAL
Lab: NORMAL
POTASSIUM SERPL-SCNC: 4 MMOL/L (ref 3.5–5.2)
PROT SERPL-MCNC: 7.5 G/DL (ref 6–8.5)
SODIUM SERPL-SCNC: 138 MMOL/L (ref 136–145)
T4 FREE SERPL-MCNC: 1.31 NG/DL (ref 0.93–1.7)
TSH SERPL DL<=0.05 MIU/L-ACNC: 2.56 UIU/ML (ref 0.27–4.2)

## 2022-03-25 PROCEDURE — 83036 HEMOGLOBIN GLYCOSYLATED A1C: CPT | Performed by: INTERNAL MEDICINE

## 2022-03-25 PROCEDURE — 84443 ASSAY THYROID STIM HORMONE: CPT

## 2022-03-25 PROCEDURE — 82947 ASSAY GLUCOSE BLOOD QUANT: CPT | Performed by: INTERNAL MEDICINE

## 2022-03-25 PROCEDURE — 80053 COMPREHEN METABOLIC PANEL: CPT

## 2022-03-25 PROCEDURE — 84439 ASSAY OF FREE THYROXINE: CPT

## 2022-03-25 PROCEDURE — 99213 OFFICE O/P EST LOW 20 MIN: CPT | Performed by: INTERNAL MEDICINE

## 2022-03-25 NOTE — PROGRESS NOTES
"     Office Note      Date: 2022  Patient Name: Sarah Sanchez  MRN: [id number]  : 1960    Chief Complaint   Patient presents with   • Prediabetes   • Thyroid Problem       History of Present Illness:   Sarah Sanchez is a 61 y.o. female who presents for Prediabetes and Thyroid Problem    She isn't taking any thyroid meds. She denies any excess iodine intake. She hasn't noted any change in the size of her neck. She denies any compressive sxs. She denies any symptoms of hypo- or hyperthyroidism at this time.  She isn't taking biotin.     She hasn't had any episodes of hypoglycemia recently. She isn't doing FSBS.  She has been taking ozempic 0.25mg q week.     She remains on spironolactone.  She saw the cardioligist.  W/u was unrevealing.  The lisinopril was stopped.  She notes much less n/v since then.      Subjective      Review of Systems:   Review of Systems   Constitutional: Negative.    Cardiovascular: Negative.    Gastrointestinal: Negative.    Endocrine: Negative.        The following portions of the patient's history were reviewed and updated as appropriate: allergies, current medications, past family history, past medical history, past social history, past surgical history and problem list.    Objective     Visit Vitals  /68 (BP Location: Left arm, Patient Position: Sitting, Cuff Size: Adult)   Pulse 70   Ht 165.1 cm (65\")   Wt 68 kg (150 lb)   SpO2 96%   BMI 24.96 kg/m²       Physical Exam:  Physical Exam  Constitutional:       Appearance: Normal appearance.   Neurological:      Mental Status: She is alert.         Labs:    TSH  No results found for: TSHBASE     Free T4  Free T4   Date Value Ref Range Status   10/25/2021 1.27 0.93 - 1.70 ng/dL Final       T3  No results found for: [id number]      TPO  No results found for: THYROIDAB    TG AB  No results found for: THGAB    TG  No results found for: THYROGLB    CBC w/DIFF  No results found for: WBC, RBC, HGB, HCT, MCV, MCH, MCHC, " RDW, RDWSD, MPV, PLT, NEUTRORELPCT, LYMPHORELPCT, MONORELPCT, EOSRELPCT, BASORELPCT, AUTOIGPER, NEUTROABS, LYMPHSABS, MONOSABS, EOSABS, BASOSABS, AUTOIGNUM, NRBC        Assessment / Plan      Assessment & Plan:  Diagnoses and all orders for this visit:    1. Prediabetes (Primary)  Assessment & Plan:  A1c looks good.  We discussed continued use of low dose ozempic.      Orders:  -     POC Glucose, Blood  -     POC Glycosylated Hemoglobin (Hb A1C)  -     Comprehensive Metabolic Panel; Future    2. Abnormal thyroid function test  Assessment & Plan:  Check TFTs today.    Orders:  -     TSH; Future  -     T4, Free; Future      Return in about 6 months (around 9/25/2022) for Recheck with A1c, TSH, free T4.    Romero Gordon MD   03/25/2022

## 2022-05-18 ENCOUNTER — OFFICE VISIT (OUTPATIENT)
Dept: CARDIOLOGY | Facility: CLINIC | Age: 62
End: 2022-05-18

## 2022-05-18 VITALS
HEIGHT: 65 IN | OXYGEN SATURATION: 98 % | SYSTOLIC BLOOD PRESSURE: 128 MMHG | WEIGHT: 146 LBS | BODY MASS INDEX: 24.32 KG/M2 | DIASTOLIC BLOOD PRESSURE: 76 MMHG | HEART RATE: 65 BPM

## 2022-05-18 DIAGNOSIS — I10 PRIMARY HYPERTENSION: Primary | ICD-10-CM

## 2022-05-18 PROCEDURE — 99213 OFFICE O/P EST LOW 20 MIN: CPT | Performed by: INTERNAL MEDICINE

## 2022-05-18 NOTE — PROGRESS NOTES
[facility] Cardiology  Office visit  Sarah Sanchez  1960  [phone]  There is no work phone number on file.    VISIT DATE:  5/18/2022    PCP: Kenya Benz MD  [address] [facility] [address] [place] [id number]    CC:  Chief Complaint   Patient presents with   • POTS       Previous cardiac studies and procedures:  March 2022  TTE  · Left ventricular ejection fraction appears to be 61 - 65%. Left ventricular systolic function is normal.  · Left ventricular diastolic function was normal.  · Left ventricular wall thickness is consistent with borderline concentric hypertrophy.  · Estimated right ventricular systolic pressure from tricuspid regurgitation is normal (<35 mmHg). Calculated right ventricular systolic pressure from tricuspid regurgitation is 22 mmHg.  Exercise myocardial perfusion imaging  · Patient denied any chest discomfort/pain, or any other symptoms during exercise.  · Expected exercise duration 7:00, actual 7:37.  · No ECG evidence of myocardial ischemia  · Left ventricular ejection fraction is hyperdynamic (Calculated EF > 70%). .  · Myocardial perfusion imaging indicates a normal myocardial perfusion study with no evidence of ischemia.        ASSESSMENT:   Diagnosis Plan   1. Primary hypertension         PLAN:  Goal blood pressure less than 130/80 mmHg.  Currently well controlled.  No further episodes of presyncope after down titration of medical therapy.  She reports some mild dependent pedal edema which is not affecting quality of life.  Reviewed results of recent cardiac evaluation which has been low risk in nature.  No further cardiac testing or medication titration indicated this time.  Patient will follow up on an as-needed basis, current cardiac risk factors appear to be well controlled.    Subjective  Interval assessment: Denies chest pain, palpitations, presyncope or syncope.  Blood pressures running less than 130/80 mmHg.  Pedal edema improved with  "down titration of amlodipine.    Initial evaluation: 61-year-old female presenting with recurrent presyncope, exertional chest discomfort and nausea over the previous 3 to 4 months.  She was started on Ozempic back in August and has lost about 30 pounds.  The previous 2 months she has had episodes of exertional nausea and vomiting.  Also with episodes of tachypalpitations.  Intermittently has a sensation of chest heaviness which can intermittently be associated with exertion in a class II pattern.  Has episodes in which she feels lightheaded if she stands up from a seated position or if she is standing for prolonged period of time in the same position.  She has had episodes of presyncope but no nicola syncope.  Blood pressures currently running less than 120/80 mmHg home.  Systolic blood pressures running in the high 90s today.  Blood pressures were stable during orthostatics but there was a 22 bpm increase in her baseline heart rate when compared with lying and standing.  She reports keeping up with her fluid intake.  Free normetanephrine was elevated at 534 pg/mL.  Follow-up CT abdomen pelvis was negative for adrenal adenoma.  She has had an unremarkable 24-hour Holter monitor.    PHYSICAL EXAMINATION:  Vitals:    05/18/22 1510   BP: 128/76   BP Location: Left arm   Patient Position: Sitting   Pulse: 65   SpO2: 98%   Weight: 66.2 kg (146 lb)   Height: 165.1 cm (65\")     General Appearance:    Alert, cooperative, no distress, appears stated age   Head:    Normocephalic, without obvious abnormality, atraumatic   Eyes:    conjunctiva/corneas clear   Nose:   Nares normal, septum midline, mucosa normal, no drainage   Throat:   Lips, teeth and gums normal   Neck:   Supple, symmetrical, trachea midline, no carotid    bruit or JVD   Lungs:     Clear to auscultation bilaterally, respirations unlabored   Chest Wall:    No tenderness or deformity    Heart:    Regular rate and rhythm, S1 and S2 normal, no murmur, rub   or " gallop, normal carotid impulse bilaterally without bruit.   Abdomen:     Soft, non-tender   Extremities:   Extremities normal, atraumatic, no cyanosis or edema   Pulses:   2+ and symmetric all extremities   Skin:   Skin color, texture, turgor normal, no rashes or lesions       Diagnostic Data:  Procedures  No results found for: CHLPL, TRIG, HDL, LDLDIRECT  Lab Results   Component Value Date    GLUCOSE 62 (L) 03/25/2022    BUN 20 03/25/2022    CREATININE 0.81 03/25/2022     03/25/2022    K 4.0 03/25/2022    CL 96 (L) 03/25/2022    CO2 28.0 03/25/2022     Lab Results   Component Value Date    HGBA1C 5.0 03/25/2022     No results found for: WBC, HGB, HCT, PLT    Allergies  Allergies   Allergen Reactions   • Penicillins Rash       Current Medications    Current Outpatient Medications:   •  amLODIPine (NORVASC) 5 MG tablet, 5 mg Daily., Disp: , Rfl:   •  aspirin 81 MG EC tablet, Take 81 mg by mouth Daily., Disp: , Rfl:   •  Blood Glucose Monitoring Suppl (OneTouch Verio Reflect) w/Device kit, , Disp: , Rfl:   •  Dexilant 60 MG capsule, 60 mg Daily., Disp: , Rfl:   •  DULoxetine (CYMBALTA) 60 MG capsule, 60 mg Daily., Disp: , Rfl:   •  hydroCHLOROthiazide (HYDRODIURIL) 25 MG tablet, Take 1 tablet by mouth Daily., Disp: 30 tablet, Rfl: 3  •  Lancets (onetouch ultrasoft) lancets, , Disp: , Rfl:   •  Linzess 290 MCG capsule capsule, 290 mcg Daily., Disp: , Rfl:   •  OneTouch Verio test strip, , Disp: , Rfl:   •  Semaglutide,0.25 or 0.5MG/DOS, (Ozempic, 0.25 or 0.5 MG/DOSE,) 2 MG/1.5ML solution pen-injector, Inject 0.5 mg under the skin into the appropriate area as directed 1 (One) Time Per Week., Disp: 1.5 mL, Rfl: 5  •  spironolactone (ALDACTONE) 50 MG tablet, 50 mg Daily., Disp: , Rfl:           ROS  ROS      SOCIAL HX  Social History     Socioeconomic History   • Marital status:    Tobacco Use   • Smoking status: Never Smoker   • Smokeless tobacco: Never Used   Vaping Use   • Vaping Use: Never used    Substance and Sexual Activity   • Alcohol use: Yes     Alcohol/week: 1.0 standard drink     Types: 1 Glasses of wine per week     Comment: social   • Drug use: Never   • Sexual activity: Not Currently     Partners: Male     Birth control/protection: Condom     Comment: Hysterectomy       FAMILY HX  Family History   Problem Relation Age of Onset   • Cancer Mother            • Hypertension Father            • Heart disease Father    • Heart attack Father            • Diabetes Brother    • Hypertension Brother    • Obesity Brother    • Diabetes Brother    • Hypertension Brother              Tanmay Sargent III, MD, FACC

## 2022-07-05 RX ORDER — HYDROCHLOROTHIAZIDE 25 MG/1
TABLET ORAL
Qty: 30 TABLET | Refills: 3 | Status: SHIPPED | OUTPATIENT
Start: 2022-07-05

## 2023-02-07 ENCOUNTER — OFFICE VISIT (OUTPATIENT)
Dept: PULMONOLOGY | Facility: CLINIC | Age: 63
End: 2023-02-07
Payer: COMMERCIAL

## 2023-02-07 VITALS
HEART RATE: 74 BPM | BODY MASS INDEX: 26.66 KG/M2 | OXYGEN SATURATION: 97 % | WEIGHT: 160 LBS | HEIGHT: 65 IN | TEMPERATURE: 97.8 F | SYSTOLIC BLOOD PRESSURE: 124 MMHG | DIASTOLIC BLOOD PRESSURE: 80 MMHG

## 2023-02-07 DIAGNOSIS — R05.3 CHRONIC COUGH: Primary | ICD-10-CM

## 2023-02-07 PROCEDURE — 94729 DIFFUSING CAPACITY: CPT | Performed by: INTERNAL MEDICINE

## 2023-02-07 PROCEDURE — 94726 PLETHYSMOGRAPHY LUNG VOLUMES: CPT | Performed by: INTERNAL MEDICINE

## 2023-02-07 PROCEDURE — 94375 RESPIRATORY FLOW VOLUME LOOP: CPT | Performed by: INTERNAL MEDICINE

## 2023-02-07 PROCEDURE — 99204 OFFICE O/P NEW MOD 45 MIN: CPT | Performed by: INTERNAL MEDICINE

## 2023-02-07 RX ORDER — CHOLECALCIFEROL (VITAMIN D3) 1250 MCG
CAPSULE ORAL
COMMUNITY
Start: 2022-02-06

## 2023-02-07 RX ORDER — VITAMIN E (DL,TOCOPHERYL ACET) 180 MG
CAPSULE ORAL
COMMUNITY
Start: 2022-02-06

## 2023-02-07 RX ORDER — MONTELUKAST SODIUM 10 MG/1
10 TABLET ORAL NIGHTLY
Qty: 90 TABLET | Refills: 3 | Status: SHIPPED | OUTPATIENT
Start: 2023-02-07

## 2023-02-07 RX ORDER — FLUTICASONE PROPIONATE 50 MCG
2 SPRAY, SUSPENSION (ML) NASAL DAILY
Qty: 16 G | Refills: 11 | Status: SHIPPED | OUTPATIENT
Start: 2023-02-07

## 2023-02-07 RX ORDER — B-COMPLEX WITH VITAMIN C
TABLET ORAL
COMMUNITY
Start: 2023-02-06

## 2023-02-07 NOTE — PROGRESS NOTES
New Patient Pulmonary Office Visit      Patient Name: Sarah Sanchez    Referring Physician: Tanmay Silva MD    Chief Complaint:    Chief Complaint   Patient presents with   • Recurrent Pneumonia       History of Present Illness: Sarah Sanchez is a 62 y.o. female who is here today to establish care with Pulmonary.  Patient has a past medical history significant for anxiety and hypertension.  He was referred to pulmonary for evaluation of a chronic cough.  Patient states that she has had a cough since October 2022.  She has had multiple rounds of infections she also has gone multiple steroids antibiotics.  And just recently started to lighten up on the cough it is usually productive she has significant drainage problems now which she had never had before.  Denies any reflux symptoms.  Denies any significant wheezing or shortness of breath.    Review of Systems:   Review of Systems   Constitutional: Negative for activity change, appetite change, chills and diaphoresis.   HENT: Negative for congestion, postnasal drip, sinus pressure and voice change.    Eyes: Negative for blurred vision.   Respiratory: Positive for cough. Negative for shortness of breath and wheezing.    Cardiovascular: Negative for chest pain.   Gastrointestinal: Negative for abdominal pain.   Musculoskeletal: Negative for myalgias.   Skin: Negative for color change and dry skin.   Allergic/Immunologic: Negative for environmental allergies.   Neurological: Negative for weakness and confusion.   Hematological: Negative for adenopathy.   Psychiatric/Behavioral: Negative for sleep disturbance and depressed mood.       Past Medical History:   Past Medical History:   Diagnosis Date   • Cancer (HCC) 2020    Skin   • Hypertension 2016   • Impaired glucose tolerance    • Raised TSH level        Past Surgical History:   Past Surgical History:   Procedure Laterality Date   • BASAL CELL CARCINOMA EXCISION         Family History:   Family History    Problem Relation Age of Onset   • Cancer Mother            • Hypertension Father            • Heart disease Father    • Heart attack Father            • Diabetes Brother    • Hypertension Brother    • Obesity Brother    • Diabetes Brother    • Hypertension Brother        Social History:   Social History     Socioeconomic History   • Marital status:    Tobacco Use   • Smoking status: Never   • Smokeless tobacco: Never   Vaping Use   • Vaping Use: Never used   Substance and Sexual Activity   • Alcohol use: Yes     Alcohol/week: 1.0 standard drink     Types: 1 Glasses of wine per week     Comment: social   • Drug use: Never   • Sexual activity: Not Currently     Partners: Male     Birth control/protection: Condom     Comment: Hysterectomy       Medications:     Current Outpatient Medications:   •  amLODIPine (NORVASC) 5 MG tablet, 5 mg Daily., Disp: , Rfl:   •  aspirin 81 MG EC tablet, Take 81 mg by mouth Daily., Disp: , Rfl:   •  Biotin w/ Vitamins C & E (Hair/Skin/Nails) 1250-7.5-7.5 MCG-MG-UNT chewable tablet, , Disp: , Rfl:   •  Cholecalciferol (Vitamin D3) 1.25 MG (63368 UT) capsule, , Disp: , Rfl:   •  Dexilant 60 MG capsule, 60 mg Daily., Disp: , Rfl:   •  DULoxetine (CYMBALTA) 60 MG capsule, 60 mg Daily., Disp: , Rfl:   •  hydroCHLOROthiazide (HYDRODIURIL) 25 MG tablet, TAKE ONE TABLET BY MOUTH DAILY, Disp: 30 tablet, Rfl: 3  •  Linzess 290 MCG capsule capsule, 290 mcg Daily., Disp: , Rfl:   •  Multiple Vitamins-Minerals (CENTRUM VITAMINTS PO), , Disp: , Rfl:   •  spironolactone (ALDACTONE) 50 MG tablet, 50 mg Daily., Disp: , Rfl:   •  Zinc 100 MG tablet, , Disp: , Rfl:   •  fluticasone (FLONASE) 50 MCG/ACT nasal spray, 2 sprays into the nostril(s) as directed by provider Daily., Disp: 16 g, Rfl: 11  •  montelukast (SINGULAIR) 10 MG tablet, Take 1 tablet by mouth Every Night., Disp: 90 tablet, Rfl: 3    Allergies:   Allergies   Allergen Reactions   • Penicillins Rash  "      Physical Exam:  Vital Signs:   Vitals:    02/07/23 1508   BP: 124/80   Pulse: 74   Temp: 97.8 °F (36.6 °C)   SpO2: 97%  Comment: resting, room air   Weight: 72.6 kg (160 lb)   Height: 165.1 cm (65\")       Physical Exam  Vitals and nursing note reviewed.   Constitutional:       General: She is not in acute distress.     Appearance: She is well-developed and normal weight. She is not ill-appearing or toxic-appearing.   HENT:      Head: Normocephalic and atraumatic.   Cardiovascular:      Rate and Rhythm: Normal rate and regular rhythm.      Pulses: Normal pulses.      Heart sounds: Normal heart sounds. No murmur heard.    No friction rub. No gallop.   Pulmonary:      Effort: Pulmonary effort is normal. No respiratory distress.      Breath sounds: Normal breath sounds. No wheezing, rhonchi or rales.   Musculoskeletal:      Right lower leg: No edema.      Left lower leg: No edema.   Skin:     General: Skin is warm and dry.   Neurological:      Mental Status: She is alert and oriented to person, place, and time.         Immunization History   Administered Date(s) Administered   • COVID-19 (MODERNA) 1st, 2nd, 3rd Dose Only 01/29/2021, 02/26/2021, 11/12/2021   • Influenza, Unspecified 10/01/2020, 10/25/2021, 09/30/2022   • Shingrix 08/30/2021, 11/29/2021       Results Review:   - I personally reviewed the pts imaging from chest x-ray on 1/16/2023 shows no acute cardiopulmonary process.  - I personally reviewed the pts PFT from 2/7/2023 shows no obstruction or restriction with a normal DLCO.  - I personally reviewed the pts Ech report from 3/4/2022 showed normal EF with normal diastolic function and normal RVSP.  - I personally reviewed the pts stress test results from 3/4/2022 showed no signs of ischemia, considered to be a low risk study.    Assessment / Plan:   Diagnoses and all orders for this visit:    1. Chronic cough (Primary)  -We discussed that the 3 most common causes of chronic cough are asthma, allergy, " and reflux.  We also discussed that the chest x-ray and PFTs are mainly look for signs of ILD which hers are normal therefore I think there is very low likelihood of being the issue.  She has no reflux symptoms right now.  Although asthma could be present with normal PFTs.  Given the fact that she is fairly asymptomatic we will hold on getting any further testing for asthma at this point.  For now we will start with the postnasal drip regimen as noted below if that does not work after 6 weeks we will then pursue further reflux or allergy testing.  She verbalized understanding of all this and agreed with the plan.  -I will start the patient on Flonase 2 puffs per nostril nightly x6 weeks, Singulair 10 mg nightly x6 weeks, a second generation antihistamine (loratidine, cetirizine, fexofenadine) daily x2 weeks, a decongestant (pseudoephedrine) x2 weeks, a first generation antihistamine (Benadryl) nightly x2 weeks, and Afrin (oxymetazoline) 2 to 3 sprays per nostril twice daily x48 hours.  I discussed with the patient the risk and benefits of these medications including but not limited to urinary retention, sleep disturbances, drowsiness and rebound rhinitis.  The patient verbalized understanding.  -I will also plan to get a CT scan of the chest without contrast to look for any possible infectious etiology such as MAC given the prolonged time course.    Follow Up:   Return in about 8 weeks (around 4/4/2023) for CT Chest with next visit.     DAVID Hargrove, DO  Pulmonary and Critical Care Medicine  Note Electronically Signed    Part of this note may be an electronic transcription/translation of spoken language to printed text using the Dragon Dictation System.

## 2023-02-09 ENCOUNTER — TRANSCRIBE ORDERS (OUTPATIENT)
Dept: PULMONOLOGY | Facility: CLINIC | Age: 63
End: 2023-02-09
Payer: COMMERCIAL

## 2023-02-21 DIAGNOSIS — Z00.6 EXAMINATION FOR NORMAL COMPARISON OR CONTROL IN CLINICAL RESEARCH: Primary | ICD-10-CM

## 2023-02-21 DIAGNOSIS — R05.3 CHRONIC COUGH: ICD-10-CM

## 2023-02-28 ENCOUNTER — SPECIALTY PHARMACY (OUTPATIENT)
Dept: ENDOCRINOLOGY | Facility: CLINIC | Age: 63
End: 2023-02-28

## 2023-02-28 NOTE — PROGRESS NOTES
Specialty Pharmacy Patient Management Program  Endocrinology Benefits Investigation      Sarah is seen by a [facility] Endocrinology provider and is currently taking a target specialty medication.  The patient IS INELIGIBLE for enrollment in the Endocrinology Patient Management Program offered by [facility] at this time.     Reason: No Longer on Therapy:

## 2023-03-06 ENCOUNTER — OFFICE VISIT (OUTPATIENT)
Dept: ENDOCRINOLOGY | Facility: CLINIC | Age: 63
End: 2023-03-06
Payer: COMMERCIAL

## 2023-03-06 VITALS
WEIGHT: 160 LBS | HEIGHT: 65 IN | SYSTOLIC BLOOD PRESSURE: 120 MMHG | DIASTOLIC BLOOD PRESSURE: 74 MMHG | BODY MASS INDEX: 26.66 KG/M2 | HEART RATE: 86 BPM | OXYGEN SATURATION: 97 %

## 2023-03-06 DIAGNOSIS — R73.03 PREDIABETES: Primary | ICD-10-CM

## 2023-03-06 DIAGNOSIS — R94.6 ABNORMAL THYROID FUNCTION TEST: ICD-10-CM

## 2023-03-06 LAB
ALBUMIN SERPL-MCNC: 4.5 G/DL (ref 3.5–5.2)
ALBUMIN/GLOB SERPL: 1.7 G/DL
ALP SERPL-CCNC: 97 U/L (ref 39–117)
ALT SERPL W P-5'-P-CCNC: 25 U/L (ref 1–33)
ANION GAP SERPL CALCULATED.3IONS-SCNC: 11 MMOL/L (ref 5–15)
AST SERPL-CCNC: 28 U/L (ref 1–32)
BILIRUB SERPL-MCNC: 0.4 MG/DL (ref 0–1.2)
BUN SERPL-MCNC: 19 MG/DL (ref 8–23)
BUN/CREAT SERPL: 23.8 (ref 7–25)
CALCIUM SPEC-SCNC: 9.9 MG/DL (ref 8.6–10.5)
CHLORIDE SERPL-SCNC: 100 MMOL/L (ref 98–107)
CO2 SERPL-SCNC: 27 MMOL/L (ref 22–29)
CREAT SERPL-MCNC: 0.8 MG/DL (ref 0.57–1)
EGFRCR SERPLBLD CKD-EPI 2021: 84.5 ML/MIN/1.73
EXPIRATION DATE: NORMAL
EXPIRATION DATE: NORMAL
GLOBULIN UR ELPH-MCNC: 2.7 GM/DL
GLUCOSE BLDC GLUCOMTR-MCNC: 102 MG/DL (ref 70–130)
GLUCOSE SERPL-MCNC: 92 MG/DL (ref 65–99)
HBA1C MFR BLD: 5.4 %
Lab: NORMAL
Lab: NORMAL
POTASSIUM SERPL-SCNC: 4.3 MMOL/L (ref 3.5–5.2)
PROT SERPL-MCNC: 7.2 G/DL (ref 6–8.5)
SODIUM SERPL-SCNC: 138 MMOL/L (ref 136–145)

## 2023-03-06 PROCEDURE — 80053 COMPREHEN METABOLIC PANEL: CPT | Performed by: INTERNAL MEDICINE

## 2023-03-06 PROCEDURE — 82947 ASSAY GLUCOSE BLOOD QUANT: CPT | Performed by: INTERNAL MEDICINE

## 2023-03-06 PROCEDURE — 83036 HEMOGLOBIN GLYCOSYLATED A1C: CPT | Performed by: INTERNAL MEDICINE

## 2023-03-06 PROCEDURE — 84443 ASSAY THYROID STIM HORMONE: CPT | Performed by: INTERNAL MEDICINE

## 2023-03-06 PROCEDURE — 84439 ASSAY OF FREE THYROXINE: CPT | Performed by: INTERNAL MEDICINE

## 2023-03-06 PROCEDURE — 99214 OFFICE O/P EST MOD 30 MIN: CPT | Performed by: INTERNAL MEDICINE

## 2023-03-06 NOTE — PROGRESS NOTES
"     Office Note      Date: 2023  Patient Name: Sarah Sanchez  MRN: [id number]  : 1960    Chief Complaint   Patient presents with   • Abnormal Lab     Abnormal thyroid test, prediabetes       History of Present Illness:   Sarah Sanchez is a 62 y.o. female who presents for Abnormal Lab (Abnormal thyroid test, prediabetes)    She isn't taking any thyroid meds. She denies any excess iodine intake. She hasn't noted any change in the size of her neck. She denies any compressive sxs. She denies any symptoms of hypo- or hyperthyroidism at this time.  She isn't taking biotin.     She hasn't had any episodes of hypoglycemia recently. She isn't doing FSBS.  She has out of ozempic.  She plans to go to [facility].    She has had some URI issues.  She has been on multiple rounds of steroids.  She has gained back some of the weight she lost.    Subjective      Review of Systems:   Review of Systems   Constitutional: Negative.    Cardiovascular: Negative.    Gastrointestinal: Negative.    Endocrine: Negative.        The following portions of the patient's history were reviewed and updated as appropriate: allergies, current medications, past family history, past medical history, past social history, past surgical history and problem list.    Objective     Visit Vitals  /74   Pulse 86   Ht 165.1 cm (65\")   Wt 72.6 kg (160 lb)   SpO2 97%   BMI 26.63 kg/m²       Physical Exam:  Physical Exam  Constitutional:       Appearance: Normal appearance.   Neck:      Thyroid: No thyroid mass, thyromegaly or thyroid tenderness.   Lymphadenopathy:      Cervical: No cervical adenopathy.   Neurological:      Mental Status: She is alert.         Labs:    TSH  No results found for: TSHBASE     Free T4  Free T4   Date Value Ref Range Status   2022 1.31 0.93 - 1.70 ng/dL Final       T3  No results found for: [id number]      TPO  No results found for: THYROIDAB    TG AB  No results found for: " THGAB    TG  No results found for: THYROGLB    CBC w/DIFF  No results found for: WBC, RBC, HGB, HCT, MCV, MCH, MCHC, RDW, RDWSD, MPV, PLT, NEUTRORELPCT, LYMPHORELPCT, MONORELPCT, EOSRELPCT, BASORELPCT, AUTOIGPER, NEUTROABS, LYMPHSABS, MONOSABS, EOSABS, BASOSABS, AUTOIGNUM, NRBC        Assessment / Plan      Assessment & Plan:  Diagnoses and all orders for this visit:    1. Prediabetes (Primary)  Assessment & Plan:  A1c looks good today.  Continue weight loss efforts.    Orders:  -     Comprehensive Metabolic Panel; Future  -     POC Glucose, Blood  -     POC Glycosylated Hemoglobin (Hb A1C)    2. Abnormal thyroid function test  Assessment & Plan:  Check TFTs today.  Will send note about results.    Orders:  -     TSH; Future  -     T4, Free; Future    Current Outpatient Medications   Medication Instructions   • amLODIPine (NORVASC) 5 mg, Daily   • aspirin 81 mg, Oral, Daily   • Biotin w/ Vitamins C & E (Hair/Skin/Nails) 1250-7.5-7.5 MCG-MG-UNT chewable tablet No dose, route, or frequency recorded.   • Cholecalciferol (Vitamin D3) 1.25 MG (70778 UT) capsule No dose, route, or frequency recorded.   • Dexilant 60 mg, Daily   • DULoxetine (CYMBALTA) 60 mg, Daily   • fluticasone (FLONASE) 50 MCG/ACT nasal spray 2 sprays, Nasal, Daily   • hydroCHLOROthiazide (HYDRODIURIL) 25 MG tablet TAKE ONE TABLET BY MOUTH DAILY   • Linzess 290 mcg, Daily   • montelukast (SINGULAIR) 10 mg, Oral, Nightly   • Multiple Vitamins-Minerals (CENTRUM VITAMINTS PO) No dose, route, or frequency recorded.   • spironolactone (ALDACTONE) 50 mg, Daily   • Zinc 100 MG tablet No dose, route, or frequency recorded.      Return in about 1 year (around 3/6/2024) for Recheck with A1c, TSH, free T4, CMP.    Romero Gordon MD   03/06/2023

## 2023-03-07 LAB
T4 FREE SERPL-MCNC: 0.98 NG/DL (ref 0.93–1.7)
TSH SERPL DL<=0.05 MIU/L-ACNC: 4.17 UIU/ML (ref 0.27–4.2)

## 2023-03-30 ENCOUNTER — OFFICE VISIT (OUTPATIENT)
Dept: BARIATRICS/WEIGHT MGMT | Facility: CLINIC | Age: 63
End: 2023-03-30
Payer: COMMERCIAL

## 2023-03-30 VITALS
DIASTOLIC BLOOD PRESSURE: 80 MMHG | OXYGEN SATURATION: 96 % | SYSTOLIC BLOOD PRESSURE: 130 MMHG | HEIGHT: 65 IN | HEART RATE: 55 BPM | BODY MASS INDEX: 26.89 KG/M2 | WEIGHT: 161.4 LBS

## 2023-03-30 DIAGNOSIS — D50.9 IRON DEFICIENCY ANEMIA, UNSPECIFIED IRON DEFICIENCY ANEMIA TYPE: ICD-10-CM

## 2023-03-30 DIAGNOSIS — E78.5 HYPERLIPIDEMIA, UNSPECIFIED HYPERLIPIDEMIA TYPE: ICD-10-CM

## 2023-03-30 DIAGNOSIS — Z51.81 ENCOUNTER FOR THERAPEUTIC DRUG LEVEL MONITORING: ICD-10-CM

## 2023-03-30 DIAGNOSIS — G47.19 EXCESSIVE DAYTIME SLEEPINESS: ICD-10-CM

## 2023-03-30 DIAGNOSIS — R53.83 OTHER FATIGUE: ICD-10-CM

## 2023-03-30 DIAGNOSIS — R06.83 SNORING: ICD-10-CM

## 2023-03-30 DIAGNOSIS — E55.9 VITAMIN D DEFICIENCY: ICD-10-CM

## 2023-03-30 DIAGNOSIS — K58.1 IRRITABLE BOWEL SYNDROME WITH CONSTIPATION: ICD-10-CM

## 2023-03-30 DIAGNOSIS — R73.03 PREDIABETES: ICD-10-CM

## 2023-03-30 DIAGNOSIS — E66.3 OVERWEIGHT (BMI 25.0-29.9): Primary | ICD-10-CM

## 2023-03-30 PROBLEM — K52.9 IBD (INFLAMMATORY BOWEL DISEASE): Status: RESOLVED | Noted: 2023-03-30 | Resolved: 2023-03-30

## 2023-03-30 PROBLEM — K52.9 IBD (INFLAMMATORY BOWEL DISEASE): Status: ACTIVE | Noted: 2023-03-30

## 2023-03-30 PROCEDURE — 99205 OFFICE O/P NEW HI 60 MIN: CPT | Performed by: NURSE PRACTITIONER

## 2023-03-30 RX ORDER — SEMAGLUTIDE 0.25 MG/.5ML
0.25 INJECTION, SOLUTION SUBCUTANEOUS WEEKLY
Qty: 2 ML | Refills: 0 | Status: SHIPPED | OUTPATIENT
Start: 2023-03-30

## 2023-03-30 NOTE — ASSESSMENT & PLAN NOTE
Has worked with GI. Has two BMs weekly with linzess and exlax. Previously tolerated semaglutide 0.25mg.

## 2023-03-30 NOTE — ASSESSMENT & PLAN NOTE
Previously improved from 5.8 to 5.0 with ozempic and weight reduction. Recent A1c 5.4. Plan to start wegovy which can also help with prediabetes and is on her formulary.

## 2023-03-30 NOTE — ASSESSMENT & PLAN NOTE
Patient's (Body mass index is 27 kg/m².) indicates that they are overweight with health conditions that include dyslipidemias and pre-diabetes . Weight is worsening. BMI is is above average; BMI management plan is completed. We discussed low calorie, low carb based diet program, portion control, increasing exercise, joining a fitness center or start home based exercise program, pharmacologic options including wegovy and an shira-based approach such as UNATION Pal or Lose It.    Topics of discussion included obesity as a disease, nutritional education on food groups, exercise, and medications. Patient was instructed in adequate protein, controlled carb and controlled fat intake.   Patient received instructions on using the medicines as a tool in controlling their weight with nutritional and behavioral changes. Risks and benefits were discussed. I believe the potential benefits of medication helping to decrease weight outweighs the risks.   Patient received our clinic education booklet.   Our patient consent form was reviewed including potential risks of weight loss. We also reviewed our confidentiality and HIPPA statements. Patients current FITT score was reviewed along with current capability for exercise tolerance and a patient will work towards a FITT score of:     Frequency   Intensity Time Strength Training   []   0 None  []   0 None  []   0 None  []   0 None    []   1 (1-2x/week) []   1 (light) []   1 (<10 min) []   1 (1x/week)   [x]   2 (3-5x/week) [x]   2 (moderate) []   2 (10-20 min) [x]   2 (2x/week)   []   3 (daily)   []   3 (moderately hard)  []   4 (very hard) []   3 (20-30 min)  [x]   4 (>30 min) []   3 (3-4x/week)       Patient's past medical history was reviewed in detail and barriers to weight loss were identified and discussed. Past efforts at weight reduction on their own as well as under physician supervision were documented and discussed.  I advised patient to continue routine care with their  Primary Care Provider.     Nutritional recommendations and goals were reviewed including Calories:900-1000 daily adjusted for exercise calories burnt, Protein: g daily, Net carbs (total carb - fiber) of 50-75g per day.  Start to keep a food journal and bring into next visit in 2 weeks for review. Practice the behavioral modification technique of mindful eating. Take one MVI daily and 2000mg fish oil daily. Take other medications and supplements as directed.  Add protein shake for breakfast and/or lunch instead of skipping. Increase hydration (cut out half/half tea and just drink unsweet).   Going to the beach for a week right before next visit with me. Plans to be physically active riding bikes but may have a couple of cocktails.   Will start wegovy at 1st follow up visit, we discussed this medication comes with high risk of weight regain with discontinuation. She understands and has no problem using it long term. Also discussed risk of constipation with higher dose, will monitor.

## 2023-03-30 NOTE — ASSESSMENT & PLAN NOTE
+snoring, mal 2-3. Has never had sleep study. Will refer for sleep consultation, discussed this can worsen weight and hunger hormones as well as increase heart risk.

## 2023-03-30 NOTE — PROGRESS NOTES
[facility]  [address]  [place] [address]   Office Note      Date: 2023  Patient Name: Sarah Sanchez  MRN: [id number]  : 1960  Subjective  Subjective     Chief Complaint  Obesity Management consult, nutrition counseling          Sarah Sanchez presents to [facility] for obesity management. She is a self referral. She has tried to get off weight that came on after steroids by trying weight watchers again, but after several months her weight is not going down. She desires to get back to her goal weight of 140-145lb to be healthier and prevent diabetes.   Recent weight history:  Lost 40lb in 42 weeks doing weight watchers in her 40s. Then gained that weight back over time, attributes it menopause, hormones, pre-diabetes, life stress. Embarked on weight loss again with her endocrinologist in  and dropped about 50lb in 5 months with diet and ozempic sample (0.25mg for several months). Got down to goal weight of 140-145lb. Kept weight off for about 6 months without ozempic then in September got recurrent congestion and took 5 rounds of antibiotics and 3 rounds of steroids and gained 20lb. now sees pulmonologist. She has always enjoyed exercise, does jazzercize twice a week. Likes to walk too but had to stop exercising when she was having hot flashes due to adverse effects with lisinopril. Previously went to [facility].   Highest lifetime weight: 192 pounds. Today's weight is 73.2 kg (161 lb 6.4 oz) pounds.   Weight 5 years ago: 141  The following seem to sabotage weight loss efforts:eating late, waking up eating and mindless eating (snacking while working or watching TV), skipping meals.   Pertinent medical history:  PTSD: managed with cymbalta, has taken for at least 5 years. Saw a therapist in the past. Symptoms stable.   IBS: takes linzess regularly. Also takes exlax. Does have hemorrhoids but stable. BM  twice a week is normal for her. Previously tolerated ozempic. Managed by GI Dr. Rich.   Raynauds: takes amlodipine.   Hypertension: previously on lisinopril and developed hot flashes and nausea and one syncopal episode, was worked up by cardiology and GI and endo and ultimately stopped lisinopril and symptoms have resolved. Stress test and echo 01/2022 were low risk findings only.   Pre-diabetes: Dr. Pritchett started her on ozempic samples, A1c improved from 5.8 to 5.0, now at 5.4. Never took metformin.   Subclinical hypothryoid: levels in and out of normal range, followed by Dr. Pritchett. Has never taken thyroid medication.   Had jair in august 2022.     Review of Systems   Constitutional: Negative for fatigue.        Positive for weight gain   HENT: Negative for trouble swallowing.         Negative for throat swelling   Respiratory: Negative for shortness of breath and wheezing.         Negative for snoring   Cardiovascular: Negative for chest pain, palpitations and leg swelling.   Gastrointestinal: Negative for abdominal pain, constipation, diarrhea, GERD and indigestion.   Endocrine: Negative for cold intolerance, heat intolerance, polydipsia, polyphagia and polyuria.        Negative for loss of hair  Negative for hirsutism     Genitourinary:        Denies menstrual irregularities   Musculoskeletal: Negative for arthralgias.        Denies exercise limitations  Denies chronic pain   Skin: Negative for dry skin.        Negative for acne   Neurological: Negative for headache and memory problem.        Negative for paresthesias   Psychiatric/Behavioral: Negative for self-injury, sleep disturbance, suicidal ideas and depressed mood. The patient is not nervous/anxious.      PHQ-9 Total Score: 0     Objective   Body mass index is 26.86 kg/m².  Body composition analysis completed and showed:   %body fat: 42.3    Measurements (in inches)  Neck: 12  Chest: 38.5  Waist: 35.5  Hips: 38.5  Thighs: 28.5    Vital Signs:   BP  "130/80   Pulse 55   Ht 165.1 cm (65\")   Wt 73.2 kg (161 lb 6.4 oz)   SpO2 96%   BMI 26.86 kg/m²     Physical Exam   General appears stated age and normal appearance   HEENT PERRLA, EOM intact and conjunctivae normal   Chest/lungs Normal rate, Regular rhythm, Breathing is unlabored and Clear to auscultation bilaterally   Abdomen Soft, normal bowel sounds, without mass or tenderness and Central adiposity   Extremities nonpitting edema left leg below knee, mild   Neuro Good historian and No focal deficit   Skin Warm, dry, intact   Psych normal behavior, normal thought content and normal concentration     Result Review :   The following data was reviewed by: KWAME Scales on 03/30/2023:  Common labs    Common Labs 3/6/23 3/6/23    1331 1336   Glucose  92   BUN  19   Creatinine  0.80   Sodium  138   Potassium  4.3   Chloride  100   Calcium  9.9   Albumin  4.5   Total Bilirubin  0.4   Alkaline Phosphatase  97   AST (SGOT)  28   ALT (SGPT)  25   Hemoglobin A1C 5.4            Most Recent A1C    HGBA1C Most Recent 3/6/23   Hemoglobin A1C 5.4           Data reviewed: Cardiology studies stress test and echo 01/2022 and Consultant notes endo            Assessment / Plan       Diagnoses and all orders for this visit:    1. Overweight (BMI 25.0-29.9) (Primary)  Assessment & Plan:  Patient's (Body mass index is 27 kg/m².) indicates that they are overweight with health conditions that include dyslipidemias and pre-diabetes . Weight is worsening. BMI is is above average; BMI management plan is completed. We discussed low calorie, low carb based diet program, portion control, increasing exercise, joining a fitness center or start home based exercise program, pharmacologic options including wegovy and an shira-based approach such as [employer] Pal or Lose It.    Topics of discussion included obesity as a disease, nutritional education on food groups, exercise, and medications. Patient was instructed in adequate protein, " controlled carb and controlled fat intake.   Patient received instructions on using the medicines as a tool in controlling their weight with nutritional and behavioral changes. Risks and benefits were discussed. I believe the potential benefits of medication helping to decrease weight outweighs the risks.   Patient received our clinic education booklet.   Our patient consent form was reviewed including potential risks of weight loss. We also reviewed our confidentiality and HIPPA statements. Patients current FITT score was reviewed along with current capability for exercise tolerance and a patient will work towards a FITT score of:     Frequency   Intensity Time Strength Training   []   0 None  []   0 None  []   0 None  []   0 None    []   1 (1-2x/week) []   1 (light) []   1 (<10 min) []   1 (1x/week)   [x]   2 (3-5x/week) [x]   2 (moderate) []   2 (10-20 min) [x]   2 (2x/week)   []   3 (daily)   []   3 (moderately hard)  []   4 (very hard) []   3 (20-30 min)  [x]   4 (>30 min) []   3 (3-4x/week)       Patient's past medical history was reviewed in detail and barriers to weight loss were identified and discussed. Past efforts at weight reduction on their own as well as under physician supervision were documented and discussed.  I advised patient to continue routine care with their Primary Care Provider.     Nutritional recommendations and goals were reviewed including Calories:900-1000 daily adjusted for exercise calories burnt, Protein: g daily, Net carbs (total carb - fiber) of 50-75g per day.  Start to keep a food journal and bring into next visit in 2 weeks for review. Practice the behavioral modification technique of mindful eating. Take one MVI daily and 2000mg fish oil daily. Take other medications and supplements as directed.  Add protein shake for breakfast and/or lunch instead of skipping. Increase hydration (cut out half/half tea and just drink unsweet).   Going to the beach for a week right before  next visit with me. Plans to be physically active riding bikes but may have a couple of cocktails.   Will start wegovy at 1st follow up visit, we discussed this medication comes with high risk of weight regain with discontinuation. She understands and has no problem using it long term. Also discussed risk of constipation with higher dose, will monitor.       Orders:  -     Lipid Panel  -     Vitamin D,25-Hydroxy  -     Ambulatory Referral to Sleep Medicine  -     Semaglutide-Weight Management (Wegovy) 0.25 MG/0.5ML solution auto-injector; Inject 0.25 mg under the skin into the appropriate area as directed 1 (One) Time Per Week.  Dispense: 2 mL; Refill: 0    2. Prediabetes  Assessment & Plan:  Previously improved from 5.8 to 5.0 with ozempic and weight reduction. Recent A1c 5.4. Plan to start wegovy which can also help with prediabetes and is on her formulary.    Orders:  -     Lipid Panel  -     Insulin, Total  -     Semaglutide-Weight Management (Wegovy) 0.25 MG/0.5ML solution auto-injector; Inject 0.25 mg under the skin into the appropriate area as directed 1 (One) Time Per Week.  Dispense: 2 mL; Refill: 0    3. Iron deficiency anemia, unspecified iron deficiency anemia type  Assessment & Plan:  Previously took iron, just restarted due to extreme fatigue. Denies active bleeding.     Orders:  -     Vitamin B12  -     Folate    4. Vitamin D deficiency  Assessment & Plan:  Currently taking OTC tablet, repeat lab to see current level.     Orders:  -     Vitamin D,25-Hydroxy    5. Excessive daytime sleepiness  Assessment & Plan:  +snoring, mal 2-3. Has never had sleep study. Will refer for sleep consultation, discussed this can worsen weight and hunger hormones as well as increase heart risk.     Orders:  -     CBC (No Diff)  -     Vitamin B12  -     Vitamin D,25-Hydroxy  -     Iron  -     Ferritin  -     Ambulatory Referral to Sleep Medicine    6. Hyperlipidemia, unspecified hyperlipidemia type  Assessment &  Plan:  Repeat labwork today.     Orders:  -     Lipid Panel    7. Other fatigue  -     CBC (No Diff)  -     Insulin, Total  -     Folate  -     Vitamin D,25-Hydroxy  -     Iron  -     Ferritin    8. Irritable bowel syndrome with constipation  Assessment & Plan:  Has worked with GI. Has two BMs weekly with linzess and exlax. Previously tolerated semaglutide 0.25mg.       9. Snoring  -     Ambulatory Referral to Sleep Medicine    10. Encounter for therapeutic drug level monitoring  -     Urine Drug Screen - Urine, Clean Catch       I spent 60 minutes caring for Sarah on this date of service. This time includes time spent by me in the following activities:preparing for the visit, reviewing tests, performing a medically appropriate examination and/or evaluation , counseling and educating the patient/family/caregiver, ordering medications, tests, or procedures and documenting information in the medical record  Follow Up   Return in about 1 week (around 4/6/2023).  Patient was given instructions and counseling regarding her condition or for health maintenance advice. Please see specific information pulled into the AVS if appropriate.     Marya Yoder, APRN  03/30/2023

## 2023-03-31 ENCOUNTER — PRIOR AUTHORIZATION (OUTPATIENT)
Dept: BARIATRICS/WEIGHT MGMT | Facility: CLINIC | Age: 63
End: 2023-03-31
Payer: COMMERCIAL

## 2023-03-31 LAB
25(OH)D3+25(OH)D2 SERPL-MCNC: 53.9 NG/ML (ref 30–100)
AMPHETAMINES UR QL SCN: NEGATIVE NG/ML
BARBITURATES UR QL SCN: NEGATIVE NG/ML
BENZODIAZ UR QL SCN: NEGATIVE NG/ML
BZE UR QL SCN: NEGATIVE NG/ML
CANNABINOIDS UR QL SCN: NEGATIVE NG/ML
CHOLEST SERPL-MCNC: 215 MG/DL (ref 100–199)
CREAT UR-MCNC: 34.5 MG/DL (ref 20–300)
ERYTHROCYTE [DISTWIDTH] IN BLOOD BY AUTOMATED COUNT: 12.6 % (ref 11.7–15.4)
FERRITIN SERPL-MCNC: 37 NG/ML (ref 15–150)
FOLATE SERPL-MCNC: 18.8 NG/ML
HCT VFR BLD AUTO: 42.3 % (ref 34–46.6)
HDLC SERPL-MCNC: 81 MG/DL
HGB BLD-MCNC: 14.4 G/DL (ref 11.1–15.9)
INSULIN SERPL-ACNC: 11 UIU/ML (ref 2.6–24.9)
IRON SERPL-MCNC: 112 UG/DL (ref 27–159)
LABORATORY COMMENT REPORT: NORMAL
LDLC SERPL CALC-MCNC: 121 MG/DL (ref 0–99)
MCH RBC QN AUTO: 29.1 PG (ref 26.6–33)
MCHC RBC AUTO-ENTMCNC: 34 G/DL (ref 31.5–35.7)
MCV RBC AUTO: 86 FL (ref 79–97)
METHADONE UR QL SCN: NEGATIVE NG/ML
OPIATES UR QL SCN: NEGATIVE NG/ML
OXYCODONE+OXYMORPHONE UR QL SCN: NEGATIVE NG/ML
PCP UR QL: NEGATIVE NG/ML
PH UR: 6.4 [PH] (ref 4.5–8.9)
PLATELET # BLD AUTO: 249 X10E3/UL (ref 150–450)
PROPOXYPH UR QL SCN: NEGATIVE NG/ML
RBC # BLD AUTO: 4.95 X10E6/UL (ref 3.77–5.28)
TRIGL SERPL-MCNC: 73 MG/DL (ref 0–149)
VIT B12 SERPL-MCNC: 368 PG/ML (ref 232–1245)
VLDLC SERPL CALC-MCNC: 13 MG/DL (ref 5–40)
WBC # BLD AUTO: 5.4 X10E3/UL (ref 3.4–10.8)

## 2023-03-31 NOTE — TELEPHONE ENCOUNTER
VEDA WANG (Key: [id number])  Rx #: [id number]  Wegovy 0.25MG/0.5ML auto-injectors    APPROVED 03/31/2023 to 10/31/2023.

## 2023-04-10 ENCOUNTER — OFFICE VISIT (OUTPATIENT)
Dept: BARIATRICS/WEIGHT MGMT | Facility: CLINIC | Age: 63
End: 2023-04-10
Payer: COMMERCIAL

## 2023-04-10 VITALS
DIASTOLIC BLOOD PRESSURE: 76 MMHG | HEART RATE: 100 BPM | HEIGHT: 65 IN | BODY MASS INDEX: 26.29 KG/M2 | WEIGHT: 157.8 LBS | SYSTOLIC BLOOD PRESSURE: 118 MMHG

## 2023-04-10 DIAGNOSIS — R73.03 PREDIABETES: ICD-10-CM

## 2023-04-10 DIAGNOSIS — E55.9 VITAMIN D DEFICIENCY: ICD-10-CM

## 2023-04-10 DIAGNOSIS — E78.5 HYPERLIPIDEMIA, UNSPECIFIED HYPERLIPIDEMIA TYPE: ICD-10-CM

## 2023-04-10 DIAGNOSIS — E66.3 OVERWEIGHT (BMI 25.0-29.9): Primary | ICD-10-CM

## 2023-04-10 PROCEDURE — 99213 OFFICE O/P EST LOW 20 MIN: CPT | Performed by: NURSE PRACTITIONER

## 2023-04-10 RX ORDER — ONDANSETRON 4 MG/1
4 TABLET, ORALLY DISINTEGRATING ORAL EVERY 8 HOURS PRN
Qty: 14 TABLET | Refills: 0 | Status: SHIPPED | OUTPATIENT
Start: 2023-04-10

## 2023-04-10 RX ORDER — CHLORAL HYDRATE 500 MG
1000 CAPSULE ORAL
COMMUNITY

## 2023-04-10 RX ORDER — SEMAGLUTIDE 0.5 MG/.5ML
0.5 INJECTION, SOLUTION SUBCUTANEOUS WEEKLY
Qty: 2 ML | Refills: 0 | Status: SHIPPED | OUTPATIENT
Start: 2023-04-10

## 2023-04-10 NOTE — ASSESSMENT & PLAN NOTE
Total cholesterol is 215 and LDL is 121. ASCVD risk 3.7% (Risk of cardiovascular event (coronary or stroke death or non-fatal MI or stroke) in next 10 years, statin not recommended. Repeat lab in 1 year.

## 2023-04-10 NOTE — PROGRESS NOTES
[facility]  [address]  [place] [address]     Office Note      Date: 04/10/2023  Patient Name: Sarah Sanchez  MRN: [id number]  : 1960  Subjective  Subjective     Chief Complaint  Obesity Management follow-up          Sarah Sanchez presents to [facility] for obesity management. This is her initial 2 week follow up. She is working on the following lifestyle changes: focusing more on tracking macronutrients and learning how to get more protein. Cut out half/half tea and only drinking water and unsweet tea (and one bloody geo on vacation).   Patient is satisfied with weight loss progress. Appetite is well controlled, started wegovy last week.  Reports some diarrhea this morning from wegovy but this is a pleasant change from her normal constipation. The patient is taking multivitamin and is taking fish oil.  The patient is using a food journal. Reviewed the data and calories were [id number], protein around 60g/day and net carbs <50g/day.   Just got back from vacation, was very active biking.  The patient is exercising with a FITT score of:     Frequency Intensity Time Strength Training   []   0, none []   0 []   0 []   0   []   1 (1-2x/week) []   1 (light) []   1 (<10 min) []   1 (1x/week)   [x]   2 (3-5x/week) [x]   2 (moderate) []   2 (10-20 min) [x]   2 (2x/week)   []   3 (daily) []   3 (moderately hard)  []   4 (very hard) []   3 (20-30 min)  [x]   4 (>30 min) []   3 (3-4x/week)     Review of Systems   Constitutional: Negative for appetite change and fatigue.   Eyes: Negative for blurred vision, double vision and visual disturbance.   Cardiovascular: Negative for chest pain and palpitations.   Gastrointestinal: Negative for abdominal pain, constipation, diarrhea, nausea, vomiting and GERD.   Endocrine: Negative for polydipsia, polyphagia and polyuria.   Musculoskeletal: Negative for arthralgias, back pain and myalgias.  "  Neurological: Negative for dizziness, tremors, light-headedness, headache and memory problem.        Parasthesias negative   Psychiatric/Behavioral: Negative for sleep disturbance, depressed mood and stress. The patient is not nervous/anxious.        Objective   Start weight: 161.4 pounds.    Total Loss lb/%Loss of beginning body weight (BBW): -3.6lb/-2.23%  Change in weight since last visit: -3.6    Body mass index is 26.26 kg/m².   Body composition analysis completed and showed:   %body fat: 42.1  Measurements (in inches)  Waist: 37.5    Vital Signs:   /76 (BP Location: Left arm, Patient Position: Sitting)   Pulse 100   Ht 165.1 cm (65\")   Wt 71.6 kg (157 lb 12.8 oz)   BMI 26.26 kg/m²     Physical Exam   General appears stated age and normal appearance   HEENT PERRLA, EOM intact and conjunctivae normal   Chest/lungs Normal rate, Regular rhythm and Breathing is unlabored   Extremities without edema   Neuro Good historian and No focal deficit   Skin Warm, dry, intact   Psych normal behavior, normal thought content and normal concentration     Result Review :   The following data was reviewed by: KWAME Scales on 04/10/2023:  Total cholesterol is 215 and LDL is 121. ASCVD risk 3.7% (Risk of cardiovascular event (coronary or stroke death or non-fatal MI or stroke) in next 10 years, statin not recommended.   Blood counts are in the normal range.   Vitamin status is acceptable (vitamin D, b-12, iron and folate).  UDS reviewed and consistent with prescribed medications. CG  Common labs    Common Labs 3/6/23 3/6/23 3/30/23 3/30/23    1331 1336 1351 1351   Glucose  92     BUN  19     Creatinine  0.80     Sodium  138     Potassium  4.3     Chloride  100     Calcium  9.9     Albumin  4.5     Total Bilirubin  0.4     Alkaline Phosphatase  97     AST (SGOT)  28     ALT (SGPT)  25     WBC   5.4    Hemoglobin   14.4    Hematocrit   42.3    Platelets   249    Total Cholesterol    215 (A)   Triglycerides    73 "   HDL Cholesterol    81   LDL Cholesterol     121 (A)   Hemoglobin A1C 5.4      (A) Abnormal value                     Assessment / Plan        Diagnoses and all orders for this visit:    1. Overweight (BMI 25.0-29.9) (Primary)  Assessment & Plan:  Patient's (Body mass index is 26.26 kg/m².) indicates that they are overweight with health conditions that include dyslipidemias and pre-diabetes . Weight is improving with lifestyle modifications. BMI is is above average; BMI management plan is completed. We discussed low calorie, low carb based diet program, portion control, increasing exercise, pharmacologic options including wegovy and an shira-based approach such as [employer] Pal or Lose It.    I have instructed the patient to continue with pursuit of medical weight loss as a part of this program. Patient does meet criteria for use of anorectics at this time as BMI >25 with  and hyperlipidemia.     Continue nutritional focus and work towards new exercise FITT goal of: 2-2-4-2.   The current plan for this month includes: continue current exercise efforts, weight loss goal 4-6lbs this month and continue nutrition focus with daily food journal. Add protein shake for breakfast or as meal replacement, keep protein >60g/day and keep up good hydration, has cut out half/half tea and drinking unsweet tea.    Start Wegovy. Denies family or personal history of pancreatitis, MTC, or MEN 2. Discussed common side effects of nausea, diarrhea, vomiting, constipation, stomach pain, headache, fatigue, upset stomach, dizziness, feeling bloated, belching, gas, stomach flu, heartburn. Prescribed zofran incase she needs it when increasing dose to 0.5mg.     Treatment goal 140-145lb.           Orders:  -     Semaglutide-Weight Management (Wegovy) 0.5 MG/0.5ML solution auto-injector; Inject 0.5 mL under the skin into the appropriate area as directed 1 (One) Time Per Week.  Dispense: 2 mL; Refill: 0  -     ondansetron ODT (ZOFRAN-ODT) 4 MG  disintegrating tablet; Place 1 tablet on the tongue Every 8 (Eight) Hours As Needed for Nausea or Vomiting.  Dispense: 14 tablet; Refill: 0    2. Hyperlipidemia, unspecified hyperlipidemia type  Assessment & Plan:  Total cholesterol is 215 and LDL is 121. ASCVD risk 3.7% (Risk of cardiovascular event (coronary or stroke death or non-fatal MI or stroke) in next 10 years, statin not recommended. Repeat lab in 1 year.      3. Vitamin D deficiency  Assessment & Plan:  Vitamin D now normal and ideal on current medication.       4. Prediabetes  Assessment & Plan:  Fasting insulin is 11 which is normal. Continue with low carb diet, regular physical activity, and weight reduction of 10-15%.             Follow Up   Return in about 4 weeks (around 5/8/2023) for Next scheduled follow up.  Patient was given instructions and counseling regarding her condition or for health maintenance advice. Please see specific information pulled into the AVS if appropriate.     Marya Yoder, APRN  04/10/2023

## 2023-04-10 NOTE — ASSESSMENT & PLAN NOTE
Patient's (Body mass index is 26.26 kg/m².) indicates that they are overweight with health conditions that include dyslipidemias and pre-diabetes . Weight is improving with lifestyle modifications. BMI is is above average; BMI management plan is completed. We discussed low calorie, low carb based diet program, portion control, increasing exercise, pharmacologic options including wegovy and an shira-based approach such as [employer] Pal or Lose It.    I have instructed the patient to continue with pursuit of medical weight loss as a part of this program. Patient does meet criteria for use of anorectics at this time as BMI >25 with  and hyperlipidemia.     Continue nutritional focus and work towards new exercise FITT goal of: 2-2-4-2.   The current plan for this month includes: continue current exercise efforts, weight loss goal 4-6lbs this month and continue nutrition focus with daily food journal. Add protein shake for breakfast or as meal replacement, keep protein >60g/day and keep up good hydration, has cut out half/half tea and drinking unsweet tea.    Start Wegovy. Denies family or personal history of pancreatitis, MTC, or MEN 2. Discussed common side effects of nausea, diarrhea, vomiting, constipation, stomach pain, headache, fatigue, upset stomach, dizziness, feeling bloated, belching, gas, stomach flu, heartburn. Prescribed zofran incase she needs it when increasing dose to 0.5mg.     Treatment goal 140-145lb.

## 2023-04-10 NOTE — ASSESSMENT & PLAN NOTE
Fasting insulin is 11 which is normal. Continue with low carb diet, regular physical activity, and weight reduction of 10-15%.

## 2023-04-12 ENCOUNTER — TELEPHONE (OUTPATIENT)
Dept: BARIATRICS/WEIGHT MGMT | Facility: CLINIC | Age: 63
End: 2023-04-12
Payer: COMMERCIAL

## 2023-04-12 NOTE — TELEPHONE ENCOUNTER
Patient took first dose on Saturday, broke out in sweats and had diarrhea that night. Has threw up twice (actual throw up not acid reflux)  Both happened first thing in the morning right after taking medications.   Patient had no symptoms yesterday but had to call in today due to throwing up and diarrhea.   Patient is taking Zofran and is not helping very much.   She is eating less than usually but still drinking protein shakes and eating enough. (grilled chicken salad)   She would like to try one more week to see if its just her body adjusting to medication.     I recommended taking it 2 days before she has her next couple of days off to help be able to rest. Patient will wait until Wednesday to take next dose.   (If that is okay with you.)  Advised patient to make sure that she keeps fluid intake up to avoid dehydration and makes sure to eat enough through out the day to avoid risk of hypoglycemia.

## 2023-04-12 NOTE — TELEPHONE ENCOUNTER
----- Message from KWAME Scales sent at 4/12/2023 12:18 PM EDT -----  Regarding: REE Janefaustinathee  Contact: [phone]  Please call and triage this- is she using zofran? How much is she throwing up/having diarrhea? Is she eating at all/staying hydrated? Thanks  ----- Message -----  From: Jazmyn Samson MA  Sent: 4/12/2023   7:56 AM EDT  To: KWAME Scales  Subject: FW: Zoey                                        ----- Message -----  From: Sarah Sanchez  Sent: 4/12/2023   7:34 AM EDT  To: [facility] Clinical Pool  Subject: Zoey                                          I am throwing up and having diarrhea daily. Does this ease up? If not, we have to try something else.

## 2023-04-20 ENCOUNTER — OFFICE VISIT (OUTPATIENT)
Dept: PULMONOLOGY | Facility: CLINIC | Age: 63
End: 2023-04-20
Payer: COMMERCIAL

## 2023-04-20 VITALS
BODY MASS INDEX: 26.76 KG/M2 | OXYGEN SATURATION: 96 % | SYSTOLIC BLOOD PRESSURE: 134 MMHG | HEART RATE: 74 BPM | TEMPERATURE: 96.4 F | HEIGHT: 65 IN | WEIGHT: 160.6 LBS | DIASTOLIC BLOOD PRESSURE: 84 MMHG

## 2023-04-20 DIAGNOSIS — R05.8 UPPER AIRWAY COUGH SYNDROME: ICD-10-CM

## 2023-04-20 DIAGNOSIS — R05.3 CHRONIC COUGH: Primary | ICD-10-CM

## 2023-04-20 RX ORDER — IPRATROPIUM BROMIDE 42 UG/1
2 SPRAY, METERED NASAL 4 TIMES DAILY
Qty: 15 ML | Refills: 11 | Status: SHIPPED | OUTPATIENT
Start: 2023-04-20

## 2023-04-20 NOTE — PROGRESS NOTES
"Follow Up Office Note       Patient Name: Sarah Sanchez    Referring Physician: No ref. provider found    Chief Complaint:    Chief Complaint   Patient presents with   • Cough     F/u        History of Present Illness: Sarah Sanchez is a 62 y.o. female who is here today to follow-up care with Pulmonary.   Patient has a past medical history significant for anxiety and hypertension.  Patient currently denies any chest pain, nausea, fever, or chills.  Cough is resolved.  She thinks that the Claritin-D and Benadryl was the most helpful.    Review of Systems:   Review of Systems   Constitutional: Negative for chills, fatigue and fever.   HENT: Negative for congestion and voice change.    Eyes: Negative for blurred vision.   Respiratory: Negative for cough, shortness of breath and wheezing.    Cardiovascular: Negative for chest pain.   Skin: Negative for dry skin.   Hematological: Negative for adenopathy.   Psychiatric/Behavioral: Negative for agitation and depressed mood.       The following portions of the patient's history were reviewed and updated as appropriate: allergies, current medications, past family history, past medical history, past social history, past surgical history and problem list.    Physical Exam:  Vital Signs:   Vitals:    04/20/23 1440   BP: 134/84   BP Location: Left arm   Patient Position: Sitting   Cuff Size: Adult   Pulse: 74   Temp: 96.4 °F (35.8 °C)   TempSrc: Infrared   SpO2: 96%  Comment: resting room air   Weight: 72.8 kg (160 lb 9.6 oz)   Height: 165.1 cm (65\")       Physical Exam  Vitals and nursing note reviewed.   Constitutional:       General: She is not in acute distress.     Appearance: She is well-developed and normal weight. She is not ill-appearing or toxic-appearing.   HENT:      Head: Normocephalic and atraumatic.   Cardiovascular:      Rate and Rhythm: Normal rate and regular rhythm.      Pulses: Normal pulses.      Heart sounds: Normal heart sounds. No murmur " heard.    No friction rub. No gallop.   Pulmonary:      Effort: Pulmonary effort is normal. No respiratory distress.      Breath sounds: Normal breath sounds. No wheezing, rhonchi or rales.   Musculoskeletal:      Right lower leg: No edema.      Left lower leg: No edema.   Skin:     General: Skin is warm and dry.   Neurological:      Mental Status: She is alert and oriented to person, place, and time.         Immunization History   Administered Date(s) Administered   • COVID-19 (MODERNA) 1st, 2nd, 3rd Dose Only 01/29/2021, 02/26/2021, 11/12/2021   • FluLaval/Fluzone >6mos 10/01/2020, 09/30/2022   • Influenza, Unspecified 10/01/2020, 10/25/2021, 09/30/2022   • Shingrix 08/30/2021, 11/29/2021       Results Review:   -I personally viewed the patient's CT of the chest from February 2023, which shows no nodules, masses, or infiltrates.  No cause for the patient's cough identified.   - chest x-ray on 1/16/2023 shows no acute cardiopulmonary process.  - PFT from 2/7/2023 shows no obstruction or restriction with a normal DLCO.  - Echo report from 3/4/2022 showed normal EF with normal diastolic function and normal RVSP.  - stress test results from 3/4/2022 showed no signs of ischemia, considered to be a low risk study.    Assessment / Plan:   Diagnoses and all orders for this visit:    1. Chronic cough (Primary)  2. Upper airway cough syndrome  -For chronic cough the patient likely has postnasal drip that is led to the cough.  We were able to treat this with Claritin-D and Benadryl which was the most successful 2 options.  I want her to utilize the Benadryl and Claritin-D if she gets into a situation where she cannot get under control which is Claritin and Atrovent.  But we ultimately will replace the Benadryl with Atrovent and ideally would like her to come off the decongestant.  But if she has trouble go back to the Benadryl and Claritin-D.  I did warn her that the decongestant can only be taken for short periods of time  or will lead to hypertension.  She verbalized understanding this and agreed with the plan.  I will see her back in 1 year if she is continue to have trouble otherwise she does not need to keep the appointment.    Follow Up:   Return in about 1 year (around 4/20/2024).       DAVID Hargrove, DO  Pulmonary and Critical Care Medicine  Note Electronically Signed    Part of this note may be an electronic transcription/translation of spoken language to printed text using the Dragon Dictation System.

## 2023-05-10 ENCOUNTER — OFFICE VISIT (OUTPATIENT)
Dept: BARIATRICS/WEIGHT MGMT | Facility: CLINIC | Age: 63
End: 2023-05-10
Payer: COMMERCIAL

## 2023-05-10 VITALS
BODY MASS INDEX: 25.69 KG/M2 | HEIGHT: 65 IN | HEART RATE: 64 BPM | DIASTOLIC BLOOD PRESSURE: 78 MMHG | WEIGHT: 154.2 LBS | SYSTOLIC BLOOD PRESSURE: 126 MMHG | OXYGEN SATURATION: 98 %

## 2023-05-10 DIAGNOSIS — R73.03 PREDIABETES: ICD-10-CM

## 2023-05-10 DIAGNOSIS — R01.1 HEART MURMUR: ICD-10-CM

## 2023-05-10 DIAGNOSIS — R53.83 OTHER FATIGUE: ICD-10-CM

## 2023-05-10 DIAGNOSIS — E66.3 OVERWEIGHT (BMI 25.0-29.9): Primary | ICD-10-CM

## 2023-05-10 RX ORDER — SEMAGLUTIDE 1 MG/.5ML
1 INJECTION, SOLUTION SUBCUTANEOUS WEEKLY
Qty: 2 ML | Refills: 0 | Status: SHIPPED | OUTPATIENT
Start: 2023-05-10

## 2023-05-10 RX ORDER — CYANOCOBALAMIN 1000 UG/ML
1000 INJECTION, SOLUTION INTRAMUSCULAR; SUBCUTANEOUS
Status: SHIPPED | OUTPATIENT
Start: 2023-05-10

## 2023-05-10 RX ADMIN — CYANOCOBALAMIN 1000 MCG: 1000 INJECTION, SOLUTION INTRAMUSCULAR; SUBCUTANEOUS at 14:41

## 2023-05-10 NOTE — ASSESSMENT & PLAN NOTE
Denies hypoglycemia. Continue wegovy. Low carb diet, regular physical activity, and weight reduction of 10-15%.

## 2023-05-10 NOTE — ASSESSMENT & PLAN NOTE
Patient's (Body mass index is 25.66 kg/m².) indicates that they are overweight with health conditions that include dyslipidemias and pre-diabetes, vitamin D deficiency . Weight is improving with treatment. BMI is is above average; BMI management plan is completed. We discussed low calorie, low carb based diet program, portion control, increasing exercise, pharmacologic options including wegovy and an shira-based approach such as [employer] Pal or Lose It.    I have instructed the patient to continue with pursuit of medical weight loss as a part of this program. Patient does meet criteria for use of anorectics at this time as this medication is indicated for LONG TERM use for management of obesity.     Continue nutritional focus and work towards new exercise FITT goal of: 2-2-4-2.  The current plan for this month includes: continue current exercise efforts, weight loss goal 4-6lbs this month and continue nutrition focus. Work on consistently logging in food journal, keep protein >60g/day and calories >800/day.Continue to prioritize hydration. Increase wegovy to 1mg. Treatment goal 145lb.

## 2023-05-10 NOTE — PROGRESS NOTES
[facility]  [address]  [place] [address]     Office Note      Date: 05/10/2023  Patient Name: Sarah Sanchez  MRN: [id number]  : 1960  Subjective  Subjective     Chief Complaint  Obesity Management follow-up          Sarah Sanchez presents to [facility] for obesity management.   Patient is satisfied with weight loss progress. Appetite is well controlled. Reports nausea and vomiting the first week of wegovy 0.5mg but it resolved after the first week. The patient is taking multivitamin and is taking fish oil.  The patient is using a food journal. Staying well hydrated, urine is pale yellow.  She is interested in trying the b-12 injection for energy.   The patient is exercising, working out in the yard- push mowing once a week and jazzercise twice a week. with a FITT score of:    Frequency Intensity Time Strength Training   []   0, none []   0 []   0 []   0   [x]   1 (1-2x/week) []   1 (light) []   1 (<10 min) []   1 (1x/week)   []   2 (3-5x/week) []   2 (moderate) []   2 (10-20 min) [x]   2 (2x/week)   []   3 (daily) [x]   3 (moderately hard)  []   4 (very hard) [x]   3 (20-30 min)  []   4 (>30 min) []   3 (3-4x/week)     Review of Systems   Constitutional: Positive for fatigue. Negative for appetite change.   Eyes: Negative for blurred vision, double vision and visual disturbance.   Cardiovascular: Negative for chest pain and palpitations.   Gastrointestinal: Positive for constipation, vomiting and GERD. Negative for abdominal pain, diarrhea and nausea.   Endocrine: Negative for polydipsia, polyphagia and polyuria.   Musculoskeletal: Negative for arthralgias, back pain and myalgias.   Neurological: Negative for dizziness, tremors, light-headedness, headache and memory problem.        Parasthesias negative   Psychiatric/Behavioral: Positive for stress. Negative for sleep disturbance and depressed mood. The patient is  "not nervous/anxious.        Objective   Start weight: 161.4 pounds.    Total Loss lb/%Loss of beginning body weight (BBW): -7.2lb/-4.46%  Change in weight since last visit: -3.8    Body mass index is 25.66 kg/m².   Body composition analysis completed and showed:   %body fat: 38.7  Measurements (in inches)  Waist: 38    Vital Signs:   /78   Pulse 64   Ht 165.1 cm (65\")   Wt 69.9 kg (154 lb 3.2 oz)   SpO2 98%   BMI 25.66 kg/m²     Physical Exam   General appears stated age and normal appearance   HEENT PERRLA, EOM intact and conjunctivae normal   Chest/lungs ERIK grade 2/6 , Normal rate, Regular rhythm and Breathing is unlabored   Extremities without edema   Neuro Good historian and No focal deficit   Skin Warm, dry, intact   Psych normal behavior, normal thought content and normal concentration     Result Review :                Assessment / Plan        Diagnoses and all orders for this visit:    1. Overweight (BMI 25.0-29.9) (Primary)  Assessment & Plan:  Patient's (Body mass index is 25.66 kg/m².) indicates that they are overweight with health conditions that include dyslipidemias and pre-diabetes, vitamin D deficiency . Weight is improving with treatment. BMI is is above average; BMI management plan is completed. We discussed low calorie, low carb based diet program, portion control, increasing exercise, pharmacologic options including wegovy and an shira-based approach such as [employer] Overnight Pal or Lose It.    I have instructed the patient to continue with pursuit of medical weight loss as a part of this program. Patient does meet criteria for use of anorectics at this time as this medication is indicated for LONG TERM use for management of obesity.     Continue nutritional focus and work towards new exercise FITT goal of: 2-2-4-2.  The current plan for this month includes: continue current exercise efforts, weight loss goal 4-6lbs this month and continue nutrition focus. Work on consistently logging in food " journal, keep protein >60g/day and calories >800/day.Continue to prioritize hydration. Increase wegovy to 1mg. Treatment goal 145lb.        Orders:  -     Semaglutide-Weight Management (Wegovy) 1 MG/0.5ML solution auto-injector; Inject 0.5 mL under the skin into the appropriate area as directed 1 (One) Time Per Week.  Dispense: 2 mL; Refill: 0  -     cyanocobalamin injection 1,000 mcg    2. Prediabetes  Assessment & Plan:  Denies hypoglycemia. Continue wegovy. Low carb diet, regular physical activity, and weight reduction of 10-15%.         3. Other fatigue  Assessment & Plan:  Understands b-12 injection is off label for fatigue but many patients report that it helps.     Orders:  -     cyanocobalamin injection 1,000 mcg    4. Heart murmur  Assessment & Plan:  Not new. See echo (mild mitral and tricuspid regurg). Asymptomatic.           Follow Up   Return in about 6 weeks (around 6/21/2023) for Next scheduled follow up.  Patient was given instructions and counseling regarding her condition or for health maintenance advice. Please see specific information pulled into the AVS if appropriate.     Marya Yoder, APRN  05/10/2023

## 2023-08-08 NOTE — PROGRESS NOTES
Chief Complaint  No chief complaint on file.    Subjective     History of Present Illness:  Sarah Sanchez is a 63 y.o. female with a history of hypertension, hyperlipidemia, heart murmur, depression, skin cancer, and impaired glucose tolerance.  The patient is referred by KWAME Dsouza with family medicine.  Review of patient's self-reported questionnaire notes symptoms including snoring, daytime sleepiness and fatigue, and frequent awakening.  The patient states these have been ongoing for more than 5 years.  The patient estimates an average of 5-6 hours of sleep per night.  It takes approximately 5-10 minutes for her to go to sleep.  She denies use of tobacco, drinks alcohol monthly or less, and denies use of recreational or illicit drugs.  Patient drinks 1 cola daily.  The patient's significant other reports witnessed episodes of heavy snoring.    Patient is prescribed Wegovy    Further details are as follows:    Marion Scale is (out of 24):       Estimated average amount of sleep per night: 5-6 hours  Number of times she wakes up at night: {0-10:28888}  Difficulty falling back asleep: {YES:14528}  It usually takes 5-10 minutes to go to sleep.  She feels sleepy upon waking up: yes  Rotating or night shift work: no    Drowsiness/Sleepiness:  She exhibits the following:  {HPI Drowsiness:43465}    Snoring/Breathing:  She exhibits the following:  {KWKSNORIN}    Head Injury:  She exhibits the following:  {KWKHEADINJURY:32227}    Reflux:  She describes the following:  {HPI Reflux:02656}    Narcolepsy:  She exhibits the following:  {AET narcolepsy:86866}    RLS/PLMs:  She describes the following:  {AET RLS/PLMS:52752}    Insomnia:  She describes the following:  {HPI-insomnia:15233}    Parasomnia:  She exhibits the following:  {HPI parasomnia:81597}    Weight: There were no vitals filed for this visit.   Weight change in the last year:  {HPI weight:33201}    The patient's relevant past medical,  surgical, family, and social history reviewed and updated in Epic as appropriate.    Review of Systems   Constitutional:  Positive for fatigue.   HENT: Negative.     Eyes: Negative.    Respiratory: Negative.     Cardiovascular: Negative.    Gastrointestinal: Negative.    Endocrine: Negative.    Genitourinary: Negative.    Musculoskeletal: Negative.    Skin: Negative.    Allergic/Immunologic: Negative.    Neurological: Negative.    Hematological: Negative.    Psychiatric/Behavioral: Negative.     All other systems reviewed and are negative.    PMH:    Past Medical History:   Diagnosis Date    Cancer 2020    Skin    Depression     Heart murmur 5/10/2023    Hyperlipidemia 03/30/2023    Hypertension 2016    Impaired glucose tolerance     Raised TSH level     Vitamin D deficiency 03/30/2023     Past Surgical History:   Procedure Laterality Date    BASAL CELL CARCINOMA EXCISION      CHOLECYSTECTOMY N/A 08/07/2022    HYSTERECTOMY N/A 2015     OB History    No obstetric history on file.       Allergies   Allergen Reactions    Penicillins Rash       MEDS:  Prior to Admission medications    Medication Sig Start Date End Date Taking? Authorizing Provider   amLODIPine (NORVASC) 5 MG tablet 1 tablet Daily. 10/14/21   Luba Diaz MD   aspirin 81 MG EC tablet Take 1 tablet by mouth Daily.    Luba Diaz MD   Biotin w/ Vitamins C & E (Hair/Skin/Nails) 1250-7.5-7.5 MCG-MG-UNT chewable tablet  2/6/22   Luba Diaz MD   Cholecalciferol (Vitamin D3) 1.25 MG (26565 UT) capsule  2/6/22   Luba Diaz MD   Dexilant 60 MG capsule 1 capsule Daily. 3/5/21   Luba Diaz MD   DULoxetine (CYMBALTA) 60 MG capsule 1 capsule Daily. 2/25/21   Luba Diaz MD   Ferrous Sulfate (Iron) 28 MG tablet Take  by mouth.    Luba Diaz MD   hydroCHLOROthiazide (HYDRODIURIL) 25 MG tablet TAKE ONE TABLET BY MOUTH DAILY 7/5/22   Tanmay Sargent III, MD   ipratropium (ATROVENT) 0.06 % nasal spray  2 sprays into the nostril(s) as directed by provider 4 (Four) Times a Day. 23   Oskar Hargrove DO Linzess 290 MCG capsule capsule 1 capsule Daily. 21   Luba Diaz MD   Liraglutide (SAXENDA) 18 MG/3ML injection pen Inject 0.6 mg under the skin into the appropriate area as directed Daily for 7 days, THEN 1.2 mg Daily for 7 days, THEN 1.8 mg Daily for 7 days, THEN 2.4 mg Daily for 7 days, THEN 3 mg Daily for 7 days. 23  Marya Yoder APRN   montelukast (SINGULAIR) 10 MG tablet Take 1 tablet by mouth Every Night. 23   Luba Diaz MD   Multiple Vitamins-Minerals (CENTRUM VITAMINTS PO)  22   Luba Diaz MD   Omega-3 Fatty Acids (fish oil) 1000 MG capsule capsule Take 1 capsule by mouth Daily With Breakfast.    Luba Diaz MD   ondansetron ODT (ZOFRAN-ODT) 4 MG disintegrating tablet Place 1 tablet on the tongue Every 8 (Eight) Hours As Needed for Nausea or Vomiting. 4/10/23   Marya Yoder APRN   spironolactone (ALDACTONE) 50 MG tablet 1 tablet Daily. 3/6/21   Luba Diaz MD   Zinc 100 MG tablet  23   Luba Diaz MD       FH:  Family History   Problem Relation Age of Onset    Cancer Mother             Hypertension Father             Heart disease Father     Heart attack Father             Diabetes Brother     Hypertension Brother     Obesity Brother     Diabetes Brother     Hypertension Brother     Thyroid disease Sister        Objective   Vital Signs:  There were no vitals taken for this visit.         {KWKTobacco (Optional):22228}    Physical Exam    {Mallampati Score:99931}    Result Review :  {The following data was reviewed by (Optional):11420}  {Ambulatory Labs (Optional):89723}  {Data reviewed (Optional):86290:::1}        Assessment and Plan  Sarah Sanchez is a 63 y.o. female who presents for further evaluation of excessive daytime sleepiness and fatigue, nonrestorative  sleep, and concerns for sleep disordered breathing and obstructive sleep apnea.  We will obtain a home sleep test for further evaluation.  The patient will return for follow-up and recommendations after test.  I have discussed weight loss as it pertains to obstructive sleep apnea.    There are no diagnoses linked to this encounter.    {Level of Risk (Optional):61421}         I discussed the consequences of uncontrolled sleep apnea including hypertension, heart disease, diabetes, stroke, and dementia. I further discussed sleep apnea therapeutic options including CPAP, Weight loss, Oral dental appliance, and surgery.    {2021TIMESPENTOPTIONAL (Optional):87277}     Follow Up  No follow-ups on file.  Patient was given instructions and counseling regarding her condition or for health maintenance advice. Please see specific information pulled into the AVS if appropriate.     KWAME Rowe, ACNP-BC  Pulmonology, Critical Care, and Sleep Medicine

## 2023-08-09 ENCOUNTER — OFFICE VISIT (OUTPATIENT)
Dept: SLEEP MEDICINE | Facility: HOSPITAL | Age: 63
End: 2023-08-09
Payer: COMMERCIAL

## 2023-08-09 VITALS
HEART RATE: 65 BPM | SYSTOLIC BLOOD PRESSURE: 128 MMHG | OXYGEN SATURATION: 96 % | BODY MASS INDEX: 25.16 KG/M2 | HEIGHT: 65 IN | DIASTOLIC BLOOD PRESSURE: 78 MMHG | WEIGHT: 151 LBS

## 2023-08-09 DIAGNOSIS — G47.33 OBSTRUCTIVE SLEEP APNEA, ADULT: ICD-10-CM

## 2023-08-09 DIAGNOSIS — R06.83 SNORING: ICD-10-CM

## 2023-08-09 DIAGNOSIS — G47.19 EXCESSIVE DAYTIME SLEEPINESS: Primary | ICD-10-CM

## 2023-08-12 ENCOUNTER — PATIENT MESSAGE (OUTPATIENT)
Dept: BARIATRICS/WEIGHT MGMT | Facility: CLINIC | Age: 63
End: 2023-08-12
Payer: COMMERCIAL

## 2023-08-12 DIAGNOSIS — E66.3 OVERWEIGHT (BMI 25.0-29.9): Primary | ICD-10-CM

## 2023-08-24 RX ORDER — SEMAGLUTIDE 1.7 MG/.75ML
1.7 INJECTION, SOLUTION SUBCUTANEOUS WEEKLY
Qty: 3 ML | Refills: 2 | Status: SHIPPED | OUTPATIENT
Start: 2023-08-24 | End: 2023-08-24 | Stop reason: SDUPTHER

## 2023-08-24 RX ORDER — SEMAGLUTIDE 1.7 MG/.75ML
1.7 INJECTION, SOLUTION SUBCUTANEOUS WEEKLY
Qty: 3 ML | Refills: 2 | Status: SHIPPED | OUTPATIENT
Start: 2023-08-24

## 2023-08-24 NOTE — TELEPHONE ENCOUNTER
From: Sarah Sanchez  To: Marya Yoder  Sent: 8/12/2023 7:38 PM EDT  Subject: Medicine    I am out of the free sample of medicine. Plenty of needles left no meds.

## 2023-08-31 ENCOUNTER — HOSPITAL ENCOUNTER (OUTPATIENT)
Dept: SLEEP MEDICINE | Facility: HOSPITAL | Age: 63
End: 2023-08-31
Payer: COMMERCIAL

## 2023-08-31 VITALS — HEIGHT: 65 IN | WEIGHT: 151 LBS | BODY MASS INDEX: 25.16 KG/M2

## 2023-08-31 DIAGNOSIS — R06.83 SNORING: ICD-10-CM

## 2023-08-31 DIAGNOSIS — G47.19 EXCESSIVE DAYTIME SLEEPINESS: ICD-10-CM

## 2023-08-31 DIAGNOSIS — G47.33 OBSTRUCTIVE SLEEP APNEA, ADULT: ICD-10-CM

## 2023-08-31 PROCEDURE — 95800 SLP STDY UNATTENDED: CPT

## 2023-09-05 ENCOUNTER — OFFICE VISIT (OUTPATIENT)
Dept: BARIATRICS/WEIGHT MGMT | Facility: CLINIC | Age: 63
End: 2023-09-05
Payer: COMMERCIAL

## 2023-09-05 VITALS
OXYGEN SATURATION: 99 % | DIASTOLIC BLOOD PRESSURE: 80 MMHG | HEIGHT: 65 IN | HEART RATE: 76 BPM | WEIGHT: 147.4 LBS | RESPIRATION RATE: 16 BRPM | BODY MASS INDEX: 24.56 KG/M2 | SYSTOLIC BLOOD PRESSURE: 130 MMHG

## 2023-09-05 DIAGNOSIS — Z71.3 WEIGHT LOSS COUNSELING, ENCOUNTER FOR: Primary | ICD-10-CM

## 2023-09-05 DIAGNOSIS — R73.03 PREDIABETES: ICD-10-CM

## 2023-09-05 NOTE — ASSESSMENT & PLAN NOTE
Denies hypoglycemia. Continue low carb diet, regular physical activity, and weight reduction of 10-15%. Continue wegovy.

## 2023-09-05 NOTE — PROGRESS NOTES
[facility]  [address]  [place] [address]     Office Note      Date: 2023  Patient Name: Sarah Sanchez  MRN: [id number]  : 1960  Subjective  Subjective     Chief Complaint  Obesity Management follow-up          Sarah Sanchez presents to [facility] for obesity management.   Patient is satisfied with weight loss progress. Appetite is well controlled. Reports nausea and vomiting the night of her first wegovy 1.7mg dose then she was not able to eat much the next two days. The patient is taking multivitamin and is taking fish oil. The patient is using a food journal.    B: protein shake  L: salad, grilled chicken, veggies  D: salad   Being mindful of her protein intake, getting at least 60g/day. Doing great with water intake.   The patient is exercising with a FITT score of: 8    Frequency Intensity Time Strength Training   []   0, none []   0 []   0 [x]   0   [x]   1 (1-2x/week) []   1 (light) []   1 (<10 min) []   1 (1x/week)   []   2 (3-5x/week) []   2 (moderate) []   2 (10-20 min) []   2 (2x/week)   []   3 (daily) [x]   3 (moderately hard)  []   4 (very hard) []   3 (20-30 min)  [x]   4 (>30 min) []   3 (3-4x/week)     Review of Systems   Constitutional:  Negative for appetite change and fatigue.   Eyes:  Negative for blurred vision, double vision and visual disturbance.   Cardiovascular:  Negative for chest pain and palpitations.   Gastrointestinal:  Positive for nausea and vomiting. Negative for abdominal pain, constipation, diarrhea and GERD.   Endocrine: Negative for polydipsia, polyphagia and polyuria.   Musculoskeletal:  Negative for arthralgias, back pain and myalgias.   Neurological:  Negative for dizziness, tremors, light-headedness, headache and memory problem.        Parasthesias negative   Psychiatric/Behavioral:  Negative for sleep disturbance, depressed mood and stress. The patient is not  "nervous/anxious.    All other systems reviewed and are negative.    Objective   Start weight: 161 pounds.    Total Loss lb/%Loss of beginning body weight (BBW): -13.6 lb/-8.45%  Change in weight since last visit: -5.6    Body mass index is 24.53 kg/m².   Body composition analysis completed and showed:   Body Fat %: 42.5    Measurements (in inches)  Waist Circumference: 35      Vital Signs:   /80 (BP Location: Left arm, Patient Position: Sitting)   Pulse 76   Resp 16   Ht 165.1 cm (65\")   Wt 66.9 kg (147 lb 6.4 oz)   SpO2 99%   BMI 24.53 kg/m²     Physical Exam   General appears stated age and normal appearance   HEENT PERRLA, EOM intact, and conjunctivae normal   Chest/lungs ERIK grade 2/6, Normal rate, Regular rhythm, and Breathing is unlabored   Extremities without edema   Neuro Good historian and No focal deficit   Skin Warm, dry, intact   Psych normal behavior, normal thought content, and normal concentration     Result Review :                Assessment / Plan        Diagnoses and all orders for this visit:    1. Weight loss counseling, encounter for (Primary)  Assessment & Plan:  Patient's (Body mass index is 24.53 kg/m².) is now normal. History of obesity with associated health conditions that include dyslipidemias and prediabetes  . Weight is improving with treatment. We discussed low calorie, low carb based diet program, portion control, increasing exercise, pharmacologic options including wegovy, and an shira-based approach such as [employer] Pal or Lose It.     I have instructed the patient to continue with pursuit of medical weight loss as a part of this program. Patient does meet criteria for use of anorectics at this time as this medication is indicated for LONG TERM use for management of obesity.     Continue nutritional focus and work towards new exercise FITT goal of: 2-2-4-2.  The current plan for this month includes:   - Continue current exercise efforts  - Weight loss goal 4-6lbs this " month  - Continue nutrition focus  - Continue to prioritize protein, fiber, and hydration.   - Almost to treatment goal of 140-145lb. Will discuss transition to maintenance next visit, unsure which dose of wegovy will be appropriate. If nausea/vomiting/extreme appetite suppression persists, consider keeping at 1.7mg.         2. Prediabetes  Assessment & Plan:  Denies hypoglycemia. Continue low carb diet, regular physical activity, and weight reduction of 10-15%. Continue wegovy.              Follow Up   Return in about 5 weeks (around 10/10/2023) for Next scheduled follow up.  Patient was given instructions and counseling regarding her condition or for health maintenance advice. Please see specific information pulled into the AVS if appropriate.     Marya Yoder, KWAME  09/05/2023

## 2023-09-05 NOTE — ASSESSMENT & PLAN NOTE
Patient's (Body mass index is 24.53 kg/m².) is now normal. History of obesity with associated health conditions that include dyslipidemias and prediabetes  . Weight is improving with treatment. We discussed low calorie, low carb based diet program, portion control, increasing exercise, pharmacologic options including wegovy, and an shira-based approach such as [employer] Pal or Lose It.     I have instructed the patient to continue with pursuit of medical weight loss as a part of this program. Patient does meet criteria for use of anorectics at this time as this medication is indicated for LONG TERM use for management of obesity.     Continue nutritional focus and work towards new exercise FITT goal of: 2-2-4-2.  The current plan for this month includes:   - Continue current exercise efforts  - Weight loss goal 4-6lbs this month  - Continue nutrition focus  - Continue to prioritize protein, fiber, and hydration.   - Almost to treatment goal of 140-145lb. Will discuss transition to maintenance next visit, unsure which dose of wegovy will be appropriate. If nausea/vomiting/extreme appetite suppression persists, consider keeping at 1.7mg.

## 2023-09-06 DIAGNOSIS — G47.19 EXCESSIVE DAYTIME SLEEPINESS: ICD-10-CM

## 2023-09-06 DIAGNOSIS — G47.33 OSA (OBSTRUCTIVE SLEEP APNEA): Primary | ICD-10-CM

## 2023-09-06 NOTE — PROGRESS NOTES
ADVISED PATIENT OF STUDY RESULTS VIA [employer]. AWAITING RESPONSE ON DECISION FOR LENA TREATMENT.

## 2023-09-11 ENCOUNTER — TELEPHONE (OUTPATIENT)
Dept: SLEEP MEDICINE | Facility: HOSPITAL | Age: 63
End: 2023-09-11
Payer: COMMERCIAL

## 2023-09-11 NOTE — TELEPHONE ENCOUNTER
Advised patient that in lab study would be required. She wants to think about her treatment options and will give us a call back when she decides.

## 2023-09-11 NOTE — TELEPHONE ENCOUNTER
----- Message from Chucky Rivera MD sent at 9/7/2023  8:30 PM EDT -----  Let the patient know that while the test may not have been optimal, it clearly shows the presence of sleep apnea during the time she was asleep.  I can try to get her insurance to cover an in lab PSG but that may be difficult given the findings.  I will try if she would like.  ----- Message -----  From: Jena Sarabia  Sent: 9/7/2023   7:47 AM EDT  To: Chucky Rivera MD    Please advise.  ----- Message -----  From: Maame Hay, Nabil Rep  Sent: 9/7/2023   7:46 AM EDT  To: Jena Sarabia    We cannot do another home sleep study with positive results -- she will need to come in for psg if she wants a retest.  ----- Message -----  From: Jena Sarabia  Sent: 9/7/2023   6:30 AM EDT  To: Kaushik Pecked Rep    Patient believes that her HST was inaccurate due to her being sick. Dr. Newsome has placed a new HST order.

## 2023-10-10 ENCOUNTER — OFFICE VISIT (OUTPATIENT)
Dept: BARIATRICS/WEIGHT MGMT | Facility: CLINIC | Age: 63
End: 2023-10-10
Payer: COMMERCIAL

## 2023-10-10 VITALS
OXYGEN SATURATION: 98 % | DIASTOLIC BLOOD PRESSURE: 66 MMHG | BODY MASS INDEX: 23.53 KG/M2 | HEART RATE: 82 BPM | WEIGHT: 141.2 LBS | RESPIRATION RATE: 16 BRPM | SYSTOLIC BLOOD PRESSURE: 112 MMHG | HEIGHT: 65 IN

## 2023-10-10 DIAGNOSIS — Z71.3 WEIGHT LOSS COUNSELING, ENCOUNTER FOR: Primary | ICD-10-CM

## 2023-10-10 DIAGNOSIS — R73.03 PREDIABETES: ICD-10-CM

## 2023-10-10 DIAGNOSIS — Z86.39 HISTORY OF OBESITY IN ADULTHOOD: ICD-10-CM

## 2023-10-10 PROCEDURE — 99213 OFFICE O/P EST LOW 20 MIN: CPT | Performed by: NURSE PRACTITIONER

## 2023-10-10 NOTE — ASSESSMENT & PLAN NOTE
Denies hypoglycemia. Continue low carb diet, regular physical activity, and weight reduction of 10-15%. Continue wegovy. Had labs repeated with PCP, I am requesting a copy of those.

## 2023-10-10 NOTE — PROGRESS NOTES
[facility]  [address]  [place] [address]     Office Note      Date: 10/10/2023  Patient Name: Sarah Sanchez  MRN: [id number]  : 1960  Subjective  Subjective     Chief Complaint  Obesity Management follow-up          Sarah Sanchez presents to [facility] for obesity management.   Patient is satisfied with weight loss progress. Appetite is well controlled. Reports no side effects of prescribed medications today. The patient is taking multivitamin and is taking fish oil.  The patient is not using a food journal but she continues to be mindful of her food choices.   24 hour recall:   L: protein shake and raisen bran  D: chicken tortilla soup  B: protein shake  The patient is exercising, freddie twice a week, with a FITT score of:    Frequency Intensity Time Strength Training   []   0, none []   0 []   0 []   0   [x]   1 (1-2x/week) []   1 (light) []   1 (<10 min) [x]   1 (1x/week)   []   2 (3-5x/week) []   2 (moderate) []   2 (10-20 min) []   2 (2x/week)   []   3 (daily) [x]   3 (moderately hard)  []   4 (very hard) []   3 (20-30 min)  [x]   4 (>30 min) []   3 (3-4x/week)     Review of Systems   Constitutional:  Negative for appetite change and fatigue.   Eyes:  Negative for blurred vision, double vision and visual disturbance.   Cardiovascular:  Negative for chest pain and palpitations.   Gastrointestinal:  Negative for abdominal pain, constipation, diarrhea, nausea, vomiting and GERD.   Endocrine: Negative for polydipsia, polyphagia and polyuria.   Musculoskeletal:  Negative for arthralgias, back pain and myalgias.   Neurological:  Negative for dizziness, tremors, light-headedness, headache and memory problem.        Parasthesias negative   Psychiatric/Behavioral:  Negative for sleep disturbance, depressed mood and stress. The patient is not nervous/anxious.    All other systems reviewed and are negative.      Objective  "  Start weight: 161 pounds.    Total Loss lb/%Loss of beginning body weight (BBW): -20 lb/-8.45%  Change in weight since last visit: -6.2      Body mass index is 23.5 kg/mý.   Body composition analysis completed and showed:   Body Fat %: 40.0    Measurements (in inches)  Waist Circumference: 35.5      Vital Signs:   /66 (BP Location: Left arm, Patient Position: Sitting)   Pulse 82   Resp 16   Ht 165.1 cm (65\")   Wt 64 kg (141 lb 3.2 oz)   SpO2 98%   BMI 23.50 kg/mý     Physical Exam   General appears stated age and normal appearance   HEENT PERRLA, EOM intact, and conjunctivae normal   Chest/lungs Normal rate, Regular rhythm, and Breathing is unlabored   Extremities without edema   Neuro Good historian and No focal deficit   Skin Warm, dry, intact   Psych normal behavior, normal thought content, and normal concentration     Result Review :                Assessment / Plan        Diagnoses and all orders for this visit:    1. Weight loss counseling, encounter for (Primary)  Assessment & Plan:  BMI is now normal at 23.5 however % fat is still above normal at 40% (has decreased from 42.8% in April 2023). Lean body mass lost was slightly greater than expected, encouraged patient to continue with adequate protein intake and encouraged to increase frequency of strength training. Calorie goal for maintenance is around 1200/day. Continue high protein/low carb choice. Continue wegovy at 1.7mg, tolerating well, this medication is indicated for long term use.  Follow up in 2 months.       2. Prediabetes  Assessment & Plan:  Denies hypoglycemia. Continue low carb diet, regular physical activity, and weight reduction of 10-15%. Continue wegovy. Had labs repeated with PCP, I am requesting a copy of those.         3. History of obesity in adulthood  Assessment & Plan:  Weight in 2021 wad 187lb making total loss @ 46lb/24.5% loss of beginning body weight.             Follow Up   Return in about 2 months (around " 12/10/2023) for Next scheduled follow up.  Patient was given instructions and counseling regarding her condition or for health maintenance advice. Please see specific information pulled into the AVS if appropriate.     Marya Yoder, KWAME  10/10/2023

## 2023-10-10 NOTE — ASSESSMENT & PLAN NOTE
BMI is now normal at 23.5 however % fat is still above normal at 40% (has decreased from 42.8% in April 2023). Lean body mass lost was slightly greater than expected, encouraged patient to continue with adequate protein intake and encouraged to increase frequency of strength training. Calorie goal for maintenance is around 1200/day. Continue high protein/low carb choice. Continue wegovy at 1.7mg, tolerating well, this medication is indicated for long term use.  Follow up in 2 months.

## 2023-11-15 DIAGNOSIS — E66.3 OVERWEIGHT (BMI 25.0-29.9): ICD-10-CM

## 2023-11-16 RX ORDER — SEMAGLUTIDE 1.7 MG/.75ML
1.7 INJECTION, SOLUTION SUBCUTANEOUS WEEKLY
Qty: 3 ML | Refills: 2 | Status: SHIPPED | OUTPATIENT
Start: 2023-11-16

## 2023-11-16 NOTE — TELEPHONE ENCOUNTER
Rx Refill Note  Requested Prescriptions     Pending Prescriptions Disp Refills    Semaglutide-Weight Management (Wegovy) 1.7 MG/0.75ML solution auto-injector 3 mL 2     Sig: Inject 0.75 mL under the skin into the appropriate area as directed 1 (One) Time Per Week.      Last office visit with prescribing clinician: 10/10/2023   Last telemedicine visit with prescribing clinician: Visit date not found   Next office visit with prescribing clinician: 12/4/2023                         Would you like a call back once the refill request has been completed: [] Yes [] No    If the office needs to give you a call back, can they leave a voicemail: [] Yes [] No    Nahomy Magana MA  11/16/23, 12:07 EST

## 2023-12-04 ENCOUNTER — TELEMEDICINE (OUTPATIENT)
Dept: BARIATRICS/WEIGHT MGMT | Facility: CLINIC | Age: 63
End: 2023-12-04
Payer: COMMERCIAL

## 2023-12-04 VITALS
HEIGHT: 65 IN | SYSTOLIC BLOOD PRESSURE: 120 MMHG | DIASTOLIC BLOOD PRESSURE: 83 MMHG | WEIGHT: 135.8 LBS | BODY MASS INDEX: 22.63 KG/M2

## 2023-12-04 DIAGNOSIS — R73.03 PREDIABETES: Primary | ICD-10-CM

## 2023-12-04 DIAGNOSIS — Z86.39 HISTORY OF OBESITY IN ADULTHOOD: ICD-10-CM

## 2023-12-04 PROCEDURE — 99214 OFFICE O/P EST MOD 30 MIN: CPT | Performed by: NURSE PRACTITIONER

## 2023-12-04 NOTE — ASSESSMENT & PLAN NOTE
Obesity in remission. BMI now 22.6. Weight in 2021 wad 187lb making total loss @ 52lb/27% loss of beginning body weight. Continue current dose of wegovy (1.7mg), consider decreasing dose to 1mg if weight <120lb. Patient advised to add strength training to prevent loss of muscle and retain mobility.

## 2023-12-04 NOTE — ASSESSMENT & PLAN NOTE
Improved with lifestyle changes, weight reduction and wegovy. Denies hypoglycemia. Continue low carb diet, regular physical activity, and weight reduction of 10-15%. Continue wegovy.

## 2023-12-04 NOTE — PROGRESS NOTES
Office Note      Date: 2023  Patient Name: Sarah Sanchez  MRN: [id number]  : 1960    This service was conducted via [employer].  Patient is located at her home address.  Provider is located at her work address. The use of a video visit has been reviewed with the patient and informed consent has been obtained.  Subjective  Subjective     Chief Complaint  Obesity Management follow-up    Subjective          Sarah Sanchez presents to [facility]IGHT MANAGEMENT via telehealth for obesity management.     Patient is satisfied with weight loss progress. Lowest weight was 131lb, was last there 2 weeks ago. Appetite is well controlled. Reports no side effects of prescribed medications today. Denies hypotension and hypoglycemia. She is not keeping a food journal at this time but she is making sure protein is present in her diet. Most of the time she eats a late lunch at 2-3 pm.    B: protein shake  L 3pm: whatever she wants but reasonable portion, makes sure it has vegetables and meat    24 hour recall:  B: scrambled eggs  L 3pm: ribs and baked potato and salad and a roll  D: mini moon pie- usually doesn't snack at night but did last night  The patient is exercising- walking 2 times a week, no strength training.   Met with health  through insurance.   Review of Systems   Constitutional:  Negative for appetite change and fatigue.   Eyes:  Negative for blurred vision, double vision and visual disturbance.   Cardiovascular:  Negative for chest pain and palpitations.   Gastrointestinal:  Negative for abdominal pain, constipation, diarrhea, nausea, vomiting and GERD.   Endocrine: Negative for polydipsia, polyphagia and polyuria.   Musculoskeletal:  Negative for arthralgias, back pain and myalgias.   Neurological:  Negative for dizziness, tremors, light-headedness, headache and memory problem.        Parasthesias negative   Psychiatric/Behavioral:  Negative for sleep disturbance,  "depressed mood and stress. The patient is not nervous/anxious.          Objective   Start weight: 161 pounds.    Total weight loss: -26 pounds/-16%  Change in weight since last visit: -6lb    Body mass index is 22.6 kg/m².   Measurements (in inches)     /83   Ht 165.1 cm (65\")   Wt 61.6 kg (135 lb 12.8 oz)   BMI 22.60 kg/m²       Result Review :                 Assessment and Plan    Diagnoses and all orders for this visit:    1. Prediabetes (Primary)  Assessment & Plan:  Improved with lifestyle changes, weight reduction and wegovy. Denies hypoglycemia. Continue low carb diet, regular physical activity, and weight reduction of 10-15%. Continue wegovy.         2. History of obesity in adulthood  Assessment & Plan:  Obesity in remission. BMI now 22.6. Weight in 2021 wad 187lb making total loss @ 52lb/27% loss of beginning body weight. Continue current dose of wegovy (1.7mg), consider decreasing dose to 1mg if weight <120lb. Patient advised to add strength training to prevent loss of muscle and retain mobility.              Follow Up   Return in about 3 months (around 3/4/2024) for Next scheduled follow up.  Patient was given instructions and counseling regarding her condition or for health maintenance advice. Please see specific information pulled into the AVS if appropriate.     KWAME Dsouza    "

## 2024-01-02 ENCOUNTER — PATIENT MESSAGE (OUTPATIENT)
Dept: BARIATRICS/WEIGHT MGMT | Facility: CLINIC | Age: 64
End: 2024-01-02
Payer: COMMERCIAL

## 2024-01-02 DIAGNOSIS — Z86.39 HISTORY OF OBESITY IN ADULTHOOD: Primary | ICD-10-CM

## 2024-01-03 RX ORDER — SEMAGLUTIDE 1 MG/.5ML
1 INJECTION, SOLUTION SUBCUTANEOUS WEEKLY
Qty: 2 ML | Refills: 0 | Status: SHIPPED | OUTPATIENT
Start: 2024-01-03

## 2024-01-03 NOTE — TELEPHONE ENCOUNTER
From: Sarah Sanchez  To: Marya Yoder  Sent: 1/2/2024 4:07 PM EST  Subject: Wegovy    The 1.7 is out of stock but they have 1.0. Can we get a refill if that dose? Since I am working on maintenance? Jaydon Rivero    Happy New Year

## 2024-01-28 DIAGNOSIS — Z86.39 HISTORY OF OBESITY IN ADULTHOOD: ICD-10-CM

## 2024-01-30 RX ORDER — SEMAGLUTIDE 1 MG/.5ML
INJECTION, SOLUTION SUBCUTANEOUS
Qty: 2 ML | Refills: 0 | Status: SHIPPED | OUTPATIENT
Start: 2024-01-30

## 2024-03-01 DIAGNOSIS — Z86.39 HISTORY OF OBESITY IN ADULTHOOD: ICD-10-CM

## 2024-03-01 RX ORDER — SEMAGLUTIDE 1 MG/.5ML
INJECTION, SOLUTION SUBCUTANEOUS
Qty: 2 ML | Refills: 0 | Status: SHIPPED | OUTPATIENT
Start: 2024-03-01 | End: 2024-03-04 | Stop reason: SDUPTHER

## 2024-03-04 ENCOUNTER — OFFICE VISIT (OUTPATIENT)
Dept: BARIATRICS/WEIGHT MGMT | Facility: CLINIC | Age: 64
End: 2024-03-04
Payer: COMMERCIAL

## 2024-03-04 VITALS
HEART RATE: 77 BPM | BODY MASS INDEX: 21.46 KG/M2 | HEIGHT: 65 IN | OXYGEN SATURATION: 99 % | DIASTOLIC BLOOD PRESSURE: 82 MMHG | WEIGHT: 128.8 LBS | SYSTOLIC BLOOD PRESSURE: 122 MMHG

## 2024-03-04 DIAGNOSIS — R73.03 PREDIABETES: ICD-10-CM

## 2024-03-04 DIAGNOSIS — E66.9 OBESITY WITH SERIOUS COMORBIDITY, UNSPECIFIED CLASSIFICATION, UNSPECIFIED OBESITY TYPE: ICD-10-CM

## 2024-03-04 DIAGNOSIS — Z71.3 WEIGHT LOSS COUNSELING, ENCOUNTER FOR: Primary | ICD-10-CM

## 2024-03-04 PROCEDURE — 99214 OFFICE O/P EST MOD 30 MIN: CPT | Performed by: NURSE PRACTITIONER

## 2024-03-04 RX ORDER — PRUCALOPRIDE 2 MG/1
2 TABLET, FILM COATED ORAL DAILY
COMMUNITY
Start: 2024-02-29

## 2024-03-04 RX ORDER — SEMAGLUTIDE 1 MG/.5ML
1 INJECTION, SOLUTION SUBCUTANEOUS WEEKLY
Qty: 2 ML | Refills: 1 | Status: SHIPPED | OUTPATIENT
Start: 2024-03-04

## 2024-03-04 NOTE — PROGRESS NOTES
[facility]  [address]  [place] [address]     Office Note      Date: 2024  Patient Name: Sarah Sanchez  MRN: [id number]  : 1960  Subjective  Subjective     Chief Complaint  Obesity Management follow-up          Sarah Sanchez presents to [facility] for obesity management.   Patient is satisfied with weight loss progress. Appetite is moderately controlled. Just got back from a cruise yesterday. Reports no side effects of prescribed medications today. Gi changed her from linzess to motegrity and that is working better. Doing well with wegovy 1mg, likes it better than the 1.7mg. The patient is taking multivitamin and is taking fish oil.  The patient is not using a food journal.  One protein shake a day and one serving of meat when she leaves wor like a salad with grilled chicken or chicken sandwich or steak/baked potato for lunch, in the evening she has fruit. She is drinking at least 64 oz of water a day. Occasionally has a 1/2 cut tea, diet coke maybe twice a week. Had labs with PCP in January.   The patient is exercising, walking, strength training twice a week, with a FITT score of:    Frequency Intensity Time Strength Training   []   0, none []   0 []   0 []   0   [x]   1 (1-2x/week) []   1 (light) []   1 (<10 min) []   1 (1x/week)   []   2 (3-5x/week) [x]   2 (moderate) []   2 (10-20 min) [x]   2 (2x/week)   []   3 (daily) []   3 (moderately hard)  []   4 (very hard) [x]   3 (20-30 min)  []   4 (>30 min) []   3 (3-4x/week)       Review of Systems   Constitutional:  Negative for appetite change and fatigue.   Eyes:  Negative for visual disturbance.   Cardiovascular:  Negative for chest pain and palpitations.   Gastrointestinal:  Negative for constipation and indigestion.   Neurological:  Negative for light-headedness.         Current Outpatient Medications:     amLODIPine (NORVASC) 5 MG tablet, 1 tablet Daily.,  "Disp: , Rfl:     aspirin 81 MG EC tablet, Take 1 tablet by mouth Daily., Disp: , Rfl:     Biotin w/ Vitamins C & E (Hair/Skin/Nails) 1250-7.5-7.5 MCG-MG-UNT chewable tablet, , Disp: , Rfl:     Cholecalciferol (Vitamin D3) 1.25 MG (46181 UT) capsule, , Disp: , Rfl:     Dexilant 60 MG capsule, 1 capsule Daily., Disp: , Rfl:     DULoxetine (CYMBALTA) 60 MG capsule, 1 capsule Daily., Disp: , Rfl:     hydroCHLOROthiazide (HYDRODIURIL) 25 MG tablet, TAKE ONE TABLET BY MOUTH DAILY, Disp: 30 tablet, Rfl: 3    Motegrity 2 MG tablet, Take 1 tablet by mouth Daily., Disp: , Rfl:     Multiple Vitamins-Minerals (CENTRUM VITAMINTS PO), , Disp: , Rfl:     Omega-3 Fatty Acids (fish oil) 1000 MG capsule capsule, Take 1 capsule by mouth Daily With Breakfast., Disp: , Rfl:     Semaglutide-Weight Management (Wegovy) 1 MG/0.5ML solution auto-injector, Inject 0.5 mL under the skin into the appropriate area as directed 1 (One) Time Per Week., Disp: 2 mL, Rfl: 1    spironolactone (ALDACTONE) 50 MG tablet, 1 tablet Daily., Disp: , Rfl:     Current Facility-Administered Medications:     cyanocobalamin injection 1,000 mcg, 1,000 mcg, Intramuscular, Q28 Days, Marya Yoder APRN, 1,000 mcg at 05/10/23 1441    Objective   Start weight: 161 pounds.    Total Loss lb/%Loss of beginning body weight (BBW): -32.2lb/20.00%  Change in weight since last visit: -7.2lb    Body mass index is 21.43 kg/m².   Body composition analysis completed and showed:   Body Fat %: 33.5    Measurements (in inches)  Waist Circumference: 34.5      Vital Signs:   /82 (BP Location: Left arm, Patient Position: Sitting)   Pulse 77   Ht 165.1 cm (65\")   Wt 58.4 kg (128 lb 12.8 oz)   SpO2 99%   BMI 21.43 kg/m²     Physical Exam   General appears stated age and normal appearance   HEENT PERRLA, EOM intact, and conjunctivae normal   Chest/lungs Normal rate, Regular rhythm, and Breathing is unlabored   Extremities without edema   Neuro Good historian and No focal " "deficit   Skin Warm, dry, intact   Psych normal behavior, normal thought content, and normal concentration     Result Review :                Assessment / Plan        Diagnoses and all orders for this visit:    1. Weight loss counseling, encounter for (Primary)  Assessment & Plan:  Obesity in remission. BMI now 21.43. Waist measurement is 34.5\". % fat is normal for age at 33.5%  Weight in 2021 was 185lb making total loss on semaglutide @ 57lb/30% loss of beginning body weight. Continue current dose of wegovy (1.0mg)- getting adequate protein and regular resistance training. Continues to meet with [facility] health . Is at treatment goal of 125-130lb.   Wegovy is indicated for long term use for management of obesity.   Requesting recent labs with PCP.          2. Obesity with serious comorbidity, unspecified classification, unspecified obesity type  Assessment & Plan:  In remission. Continue current treatment.     Orders:  -     Semaglutide-Weight Management (Wegovy) 1 MG/0.5ML solution auto-injector; Inject 0.5 mL under the skin into the appropriate area as directed 1 (One) Time Per Week.  Dispense: 2 mL; Refill: 1    3. Prediabetes  Assessment & Plan:  Improving with lifestyle changes and wegovy. Continue low carb diet, regular physical activity, and maintain weight reduction of >15%. Continue wegovy.               Follow Up   Return in about 3 months (around 6/4/2024) for Next scheduled follow up.  Patient was given instructions and counseling regarding her condition or for health maintenance advice. Please see specific information pulled into the AVS if appropriate.     Marya Yoder, KWAME  03/04/2024   "

## 2024-03-04 NOTE — ASSESSMENT & PLAN NOTE
"Obesity in remission. BMI now 21.43. Waist measurement is 34.5\". % fat is normal for age at 33.5%  Weight in 2021 was 185lb making total loss on semaglutide @ 57lb/30% loss of beginning body weight. Continue current dose of wegovy (1.0mg)- getting adequate protein and regular resistance training. Continues to meet with [facility] health . Is at treatment goal of 125-130lb.   Wegovy is indicated for long term use for management of obesity.   Requesting recent labs with PCP.      "

## 2024-03-04 NOTE — ASSESSMENT & PLAN NOTE
Improving with lifestyle changes and wegovy. Continue low carb diet, regular physical activity, and maintain weight reduction of >15%. Continue wegovy.

## 2024-05-18 DIAGNOSIS — E66.9 OBESITY WITH SERIOUS COMORBIDITY, UNSPECIFIED CLASSIFICATION, UNSPECIFIED OBESITY TYPE: ICD-10-CM

## 2024-05-20 RX ORDER — SEMAGLUTIDE 1 MG/.5ML
INJECTION, SOLUTION SUBCUTANEOUS
Qty: 2 ML | Refills: 1 | Status: SHIPPED | OUTPATIENT
Start: 2024-05-20

## 2024-06-06 DIAGNOSIS — R05.3 CHRONIC COUGH: ICD-10-CM

## 2024-06-06 RX ORDER — MONTELUKAST SODIUM 10 MG/1
10 TABLET ORAL NIGHTLY
Qty: 30 TABLET | Refills: 0 | Status: SHIPPED | OUTPATIENT
Start: 2024-06-06

## 2024-06-24 VITALS
SYSTOLIC BLOOD PRESSURE: 122 MMHG | HEART RATE: 74 BPM | DIASTOLIC BLOOD PRESSURE: 80 MMHG | HEIGHT: 65 IN | WEIGHT: 123.4 LBS | BODY MASS INDEX: 20.56 KG/M2

## 2024-06-25 ENCOUNTER — TELEMEDICINE (OUTPATIENT)
Dept: BARIATRICS/WEIGHT MGMT | Facility: CLINIC | Age: 64
End: 2024-06-25
Payer: COMMERCIAL

## 2024-06-25 DIAGNOSIS — R73.03 PREDIABETES: ICD-10-CM

## 2024-06-25 DIAGNOSIS — E66.9 OBESITY WITH SERIOUS COMORBIDITY, UNSPECIFIED CLASSIFICATION, UNSPECIFIED OBESITY TYPE: ICD-10-CM

## 2024-06-25 DIAGNOSIS — Z71.3 WEIGHT LOSS COUNSELING, ENCOUNTER FOR: Primary | ICD-10-CM

## 2024-06-25 PROCEDURE — 99214 OFFICE O/P EST MOD 30 MIN: CPT | Performed by: NURSE PRACTITIONER

## 2024-06-25 RX ORDER — SEMAGLUTIDE 1 MG/.5ML
1 INJECTION, SOLUTION SUBCUTANEOUS WEEKLY
Qty: 2 ML | Refills: 2 | Status: SHIPPED | OUTPATIENT
Start: 2024-06-25

## 2024-06-25 NOTE — ASSESSMENT & PLAN NOTE
"Obesity in remission. BMI now 20.53. Waist measurement is 32.5\". % fat is normal for age at 29.3%  Weight in 2021 was 185lb making total loss on semaglutide @ 64lb/34% loss of beginning body weight. Continue current dose of wegovy (1.0mg)- getting adequate protein and regular resistance training. Continues to meet with [facility] health . Is at treatment goal of 120-125lb.   Wegovy is indicated for long term use for management of obesity.     "

## 2024-06-25 NOTE — PROGRESS NOTES
"     Office Note      Date: 2024  Patient Name: Sarah Sanchez  MRN: [id number]  : 1960    This service was conducted via [employer].  Patient is located at her home .  Provider is located at her work address. The use of a video visit has been reviewed with the patient and informed consent has been obtained.  Subjective  Subjective     Chief Complaint  Obesity Management follow-up    Subjective          Sarah Sanchez presents to [facility]NAG[facility] via telehealth for obesity management. [place] did come in to the office for vital signs and measurements.    Patient is satisfied with weight loss progress. Appetite is well controlled. Reports no side effects of prescribed medications today. She is not keeping a food journal at this time  24 hour recall:  B: protein drink  L: grilled chicken and a salad  S: watermelon  Protein goal is 60g/day  Water goal is at least 60oz, getting easily  The patient is exercising- walking and free weights for at least 30 minutes four times a week.     Review of Systems   Constitutional:  Negative for appetite change and fatigue.   Eyes:  Negative for visual disturbance.   Cardiovascular:  Negative for chest pain and palpitations.   Gastrointestinal:  Negative for constipation and indigestion.   Neurological:  Negative for light-headedness.         Objective   Start weight: 161 pounds.    Total weight loss: -38 pounds/-23%  Change in weight since last visit: -5.4lb    Body mass index is 20.53 kg/m².   Measurements (in inches)     /80 (BP Location: Left arm, Patient Position: Sitting)   Pulse 74   Ht 165.1 cm (65\")   Wt 56 kg (123 lb 6.4 oz)   BMI 20.53 kg/m²       Result Review :                 Assessment and Plan    Diagnoses and all orders for this visit:    1. Weight loss counseling, encounter for (Primary)  Assessment & Plan:  Obesity in remission. BMI " "now 20.53. Waist measurement is 32.5\". % fat is normal for age at 29.3%  Weight in 2021 was 185lb making total loss on semaglutide @ 64lb/34% loss of beginning body weight. Continue current dose of wegovy (1.0mg)- getting adequate protein and regular resistance training. Continues to meet with [facility] health . Is at treatment goal of 120-125lb.   Wegovy is indicated for long term use for management of obesity.         2. Prediabetes  Overview:  01/2024 fasting glucose 103, A1c 5.0 on wegovy    Assessment & Plan:  Denies hypoglycemia. Continue low carb diet, regular physical activity, and weight reduction of 10-15%. Continue wegovy.         3. Obesity with serious comorbidity, unspecified classification, unspecified obesity type  -     Semaglutide-Weight Management (Wegovy) 1 MG/0.5ML solution auto-injector; Inject 0.5 mL under the skin into the appropriate area as directed 1 (One) Time Per Week.  Dispense: 2 mL; Refill: 2          Follow Up   Return in about 3 months (around 9/25/2024) for Next scheduled follow up.  Patient was given instructions and counseling regarding her condition or for health maintenance advice. Please see specific information pulled into the AVS if appropriate.     KWAME Dsouza    "

## 2024-07-22 DIAGNOSIS — E66.9 OBESITY WITH SERIOUS COMORBIDITY, UNSPECIFIED CLASSIFICATION, UNSPECIFIED OBESITY TYPE: ICD-10-CM

## 2024-07-22 NOTE — TELEPHONE ENCOUNTER
Rx Refill Note  Requested Prescriptions     Pending Prescriptions Disp Refills    Wegovy 1 MG/0.5ML solution auto-injector [Pharmacy Med Name: WEGOVY 1 MG/0.5 ML PEN] 2 mL 2     Sig: INJECT 1 MG UNDER THE SKIN ONCE WEEKLY      Last office visit with prescribing clinician: 3/4/2024   Last telemedicine visit with prescribing clinician: 6/25/2024   Next office visit with prescribing clinician: 9/23/2024                         Would you like a call back once the refill request has been completed: [] Yes [] No    If the office needs to give you a call back, can they leave a voicemail: [] Yes [] No    Nahomy Magana MA  07/22/24, 12:28 EDT

## 2024-07-23 RX ORDER — SEMAGLUTIDE 1 MG/.5ML
INJECTION, SOLUTION SUBCUTANEOUS
Qty: 2 ML | Refills: 2 | Status: SHIPPED | OUTPATIENT
Start: 2024-07-23

## 2024-09-09 NOTE — ASSESSMENT & PLAN NOTE
A1c looks good.  We discussed continued use of low dose ozempic.     well developed, well nourished , in no acute distress , ambulating without difficulty , normal communication ability

## 2024-10-24 ENCOUNTER — OFFICE VISIT (OUTPATIENT)
Dept: PULMONOLOGY | Facility: CLINIC | Age: 64
End: 2024-10-24
Payer: COMMERCIAL

## 2024-10-24 ENCOUNTER — OFFICE VISIT (OUTPATIENT)
Dept: BARIATRICS/WEIGHT MGMT | Facility: CLINIC | Age: 64
End: 2024-10-24
Payer: COMMERCIAL

## 2024-10-24 VITALS
HEIGHT: 65 IN | HEART RATE: 72 BPM | SYSTOLIC BLOOD PRESSURE: 122 MMHG | TEMPERATURE: 98.7 F | BODY MASS INDEX: 21.33 KG/M2 | DIASTOLIC BLOOD PRESSURE: 72 MMHG | OXYGEN SATURATION: 99 % | RESPIRATION RATE: 16 BRPM | WEIGHT: 128 LBS

## 2024-10-24 VITALS
HEIGHT: 65 IN | HEART RATE: 61 BPM | SYSTOLIC BLOOD PRESSURE: 120 MMHG | DIASTOLIC BLOOD PRESSURE: 78 MMHG | WEIGHT: 122 LBS | BODY MASS INDEX: 20.33 KG/M2

## 2024-10-24 DIAGNOSIS — G47.33 OSA (OBSTRUCTIVE SLEEP APNEA): ICD-10-CM

## 2024-10-24 DIAGNOSIS — R05.8 UPPER AIRWAY COUGH SYNDROME: Primary | ICD-10-CM

## 2024-10-24 DIAGNOSIS — Z71.3 WEIGHT LOSS COUNSELING, ENCOUNTER FOR: Primary | ICD-10-CM

## 2024-10-24 DIAGNOSIS — E66.9 OBESITY WITH SERIOUS COMORBIDITY, UNSPECIFIED CLASS, UNSPECIFIED OBESITY TYPE: ICD-10-CM

## 2024-10-24 DIAGNOSIS — R73.03 PREDIABETES: ICD-10-CM

## 2024-10-24 RX ORDER — SEMAGLUTIDE 1 MG/.5ML
1 INJECTION, SOLUTION SUBCUTANEOUS WEEKLY
Qty: 2 ML | Refills: 2 | Status: SHIPPED | OUTPATIENT
Start: 2024-10-24

## 2024-10-24 RX ORDER — MONTELUKAST SODIUM 10 MG/1
10 TABLET ORAL NIGHTLY
Qty: 30 TABLET | Refills: 0 | Status: SHIPPED | OUTPATIENT
Start: 2024-10-24

## 2024-10-24 NOTE — PROGRESS NOTES
[facility]  [address]  [place] [address]     Office Note      Date: 10/24/2024  Patient Name: Sarah Sanchez  MRN: [id number]  : 1960  Subjective  Subjective     Chief Complaint  Obesity Management follow-up          Sarah Sanchez presents to [facility] for obesity management.   Patient is satisfied with weight loss progress. Appetite is moderately controlled. Reports no side effects of prescribed medications today. The patient is taking multivitamin and is taking fish oil.  The patient is not using a food journal.    The patient is exercising walking 2 miles 2 times per week, weights 2 times per week for 20 minutes with a FITT score of:    Frequency Intensity Time Strength Training   []   0, none []   0 []   0 []   0   [x]   1 (1-2x/week) [x]   1 (light) []   1 (<10 min) []   1 (1x/week)   []   2 (3-5x/week) []   2 (moderate) []   2 (10-20 min) [x]   2 (2x/week)   []   3 (daily) []   3 (moderately hard)  []   4 (very hard) [x]   3 (20-30 min)  []   4 (>30 min) []   3 (3-4x/week)         Breakfast: [employer] Protein shake (30g)  Lunch: [employer] grilled chicken salad (48g)  Snack: 10 peanut butter pretzel nuggets (4g)  Dinner: 2 pieces pepperoni pizza (30g)  Water Intake: 64 oz  Tea 1/2 sweet, 1/2 unsweet  Diet soda causes her ankles to swell, she rarely has one    Review of Systems   Constitutional:  Negative for appetite change and fatigue.   Eyes:  Negative for visual disturbance.   Cardiovascular:  Negative for chest pain and palpitations.   Gastrointestinal:  Negative for constipation and indigestion.   Neurological:  Negative for light-headedness.         Current Outpatient Medications:     amLODIPine (NORVASC) 5 MG tablet, 1 tablet Daily., Disp: , Rfl:     aspirin 81 MG EC tablet, Take 1 tablet by mouth Daily., Disp: , Rfl:     Biotin w/ Vitamins C & E (Hair/Skin/Nails) 1250-7.5-7.5 MCG-MG-UNT chewable tablet, ,  "Disp: , Rfl:     Cholecalciferol (Vitamin D3) 1.25 MG (66656 UT) capsule, , Disp: , Rfl:     Dexilant 60 MG capsule, 1 capsule Daily., Disp: , Rfl:     DULoxetine (CYMBALTA) 60 MG capsule, 1 capsule Daily., Disp: , Rfl:     hydroCHLOROthiazide (HYDRODIURIL) 25 MG tablet, TAKE ONE TABLET BY MOUTH DAILY, Disp: 30 tablet, Rfl: 3    montelukast (SINGULAIR) 10 MG tablet, TAKE ONE TABLET BY MOUTH ONCE NIGHTLY, Disp: 30 tablet, Rfl: 0    Motegrity 2 MG tablet, Take 1 tablet by mouth Daily., Disp: , Rfl:     Multiple Vitamins-Minerals (CENTRUM VITAMINTS PO), , Disp: , Rfl:     Omega-3 Fatty Acids (fish oil) 1000 MG capsule capsule, Take 1 capsule by mouth Daily With Breakfast., Disp: , Rfl:     Semaglutide-Weight Management (Wegovy) 1 MG/0.5ML solution auto-injector, Inject 0.5 mL under the skin into the appropriate area as directed 1 (One) Time Per Week., Disp: 2 mL, Rfl: 2    spironolactone (ALDACTONE) 50 MG tablet, 1 tablet Daily., Disp: , Rfl:     Current Facility-Administered Medications:     cyanocobalamin injection 1,000 mcg, 1,000 mcg, Intramuscular, Q28 Days, Marya Yoder APRN, 1,000 mcg at 05/10/23 1441    Objective   Start weight: 161 pounds.    Total Loss lb/%Loss of beginning body weight (BBW): -61lb/-37.88%  Change in weight since last visit: -1.6lb    Body mass index is 20.3 kg/m².   Body composition analysis completed and showed:   Body Fat %: 33.2    Measurements (in inches)  Waist Circumference: 33      Vital Signs:   /78 (BP Location: Right arm, Patient Position: Sitting)   Pulse 61   Ht 165.1 cm (65\")   Wt 55.3 kg (122 lb)   BMI 20.30 kg/m²     No LMP recorded.    Physical Exam   General appears stated age and normal appearance   HEENT PERRLA, EOM intact, and conjunctivae normal   Chest/lungs Normal rate, Regular rhythm, and Breathing is unlabored   Extremities without edema   Neuro Good historian and No focal deficit   Skin Warm, dry, intact   Psych normal behavior, normal thought " "content, and normal concentration     Result Review :                Assessment / Plan        Diagnoses and all orders for this visit:    1. Weight loss counseling, encounter for (Primary)  Assessment & Plan:  Obesity in remission. BMI now 20.30. Waist circumference is 33\". % fat is normal for age at 33.2%  Weight in 2021 was 185lb making total loss on semaglutide @ 63lb/34% loss of beginning body weight. Continue current dose of wegovy (1.0mg)- getting adequate protein and regular resistance training. Continues to meet with [facility] . Is at treatment goal of 120-125lb.   Wegovy is indicated for long term use for management of obesity.         2. Prediabetes  Overview:  01/2024 fasting glucose 103, A1c 5.0 on wegovy    Assessment & Plan:  Denies hypoglycemia. Continue low carb diet, regular physical activity, and weight reduction of 10-15%. Continue wegovy.         3. Obesity with serious comorbidity, unspecified class, unspecified obesity type  -     Semaglutide-Weight Management (Wegovy) 1 MG/0.5ML solution auto-injector; Inject 0.5 mL under the skin into the appropriate area as directed 1 (One) Time Per Week.  Dispense: 2 mL; Refill: 2    4. LENA (obstructive sleep apnea)  Assessment & Plan:  Snoring has improved with weight loss, hasn't woken herself up in a long time. Denies excessive daytime sleepiness. No CPAP, per patient the home sleep test was inaccurate because she was sick at the time (vomiting).             Follow Up   Return in about 3 months (around 1/24/2025) for Next scheduled follow up.  Patient was given instructions and counseling regarding her condition or for health maintenance advice. Please see specific information pulled into the AVS if appropriate.     Marya Yoder, APRN  10/24/2024   "

## 2024-10-24 NOTE — ASSESSMENT & PLAN NOTE
"Obesity in remission. BMI now 20.30. Waist circumference is 33\". % fat is normal for age at 33.2%  Weight in 2021 was 185lb making total loss on semaglutide @ 63lb/34% loss of beginning body weight. Continue current dose of wegovy (1.0mg)- getting adequate protein and regular resistance training. Continues to meet with [facility] . Is at treatment goal of 120-125lb.   Wegovy is indicated for long term use for management of obesity.     "

## 2024-10-24 NOTE — ASSESSMENT & PLAN NOTE
Snoring has improved with weight loss, hasn't woken herself up in a long time. Denies excessive daytime sleepiness. No CPAP, per patient the home sleep test was inaccurate because she was sick at the time (vomiting).

## 2024-10-24 NOTE — PROGRESS NOTES
"Follow Up Office Note       Patient Name: Sarah Sanchez    Referring Physician: No ref. provider found    Chief Complaint:    Chief Complaint   Patient presents with    Cough     F/u       History of Present Illness: Sarah Sanchez is a 64 y.o. female who is here today to follow-up care with Pulmonary.   Patient has a past medical history significant for anxiety and hypertension.  Patient doing well had a crisis sinusitis a few weeks ago.  But otherwise doing well.    Review of Systems:   Review of Systems   Constitutional:  Negative for chills, fatigue and fever.   HENT:  Negative for congestion and voice change.    Eyes:  Negative for blurred vision.   Respiratory:  Negative for cough, shortness of breath and wheezing.    Cardiovascular:  Negative for chest pain.   Skin:  Negative for dry skin.   Hematological:  Negative for adenopathy.   Psychiatric/Behavioral:  Negative for agitation and depressed mood.        The following portions of the patient's history were reviewed and updated as appropriate: allergies, current medications, past family history, past medical history, past social history, past surgical history and problem list.    Physical Exam:  Vital Signs:   Vitals:    10/24/24 1517   BP: 122/72   Pulse: 72   Resp: 16   Temp: 98.7 °F (37.1 °C)   SpO2: 99%  Comment: room air at resting   Weight: 58.1 kg (128 lb)   Height: 165.1 cm (65\")       Physical Exam  Vitals and nursing note reviewed.   Constitutional:       General: She is not in acute distress.     Appearance: She is well-developed and normal weight. She is not ill-appearing or toxic-appearing.   HENT:      Head: Normocephalic and atraumatic.   Cardiovascular:      Rate and Rhythm: Normal rate and regular rhythm.      Pulses: Normal pulses.      Heart sounds: Normal heart sounds. No murmur heard.     No friction rub. No gallop.   Pulmonary:      Effort: Pulmonary effort is normal. No respiratory distress.      Breath sounds: Normal breath " sounds. No wheezing, rhonchi or rales.   Musculoskeletal:      Right lower leg: No edema.      Left lower leg: No edema.   Skin:     General: Skin is warm and dry.   Neurological:      Mental Status: She is alert and oriented to person, place, and time.         Immunization History   Administered Date(s) Administered    COVID-19 (MODERNA) 12YRS+ (SPIKEVAX) 11/07/2023    COVID-19 (MODERNA) 1st,2nd,3rd Dose Monovalent 01/29/2021, 02/26/2021, 11/12/2021    COVID-19 (MODERNA) BIVALENT 12+YRS 06/12/2023    Fluzone (or Fluarix & Flulaval for VFC) >6mos 10/01/2020, 09/30/2022, 10/16/2024    Influenza, Unspecified 10/01/2020, 10/25/2021, 09/30/2022    Shingrix 08/30/2021, 11/29/2021       Results Review:   -I personally viewed the patient's chest x-ray from 10/24/2024 which shows no acute cardiopulmonary process  -CT of the chest from February 2023, which shows no nodules, masses, or infiltrates.  No cause for the patient's cough identified.   - chest x-ray on 1/16/2023 shows no acute cardiopulmonary process.  -Personally viewed the patient's PFTs from 10/24/2024 shows no obstruction or restriction with a normal DLCO  - PFT from 2/7/2023 shows no obstruction or restriction with a normal DLCO.  - Echo report from 3/4/2022 showed normal EF with normal diastolic function and normal RVSP.  - stress test results from 3/4/2022 showed no signs of ischemia, considered to be a low risk study.    Assessment / Plan:   Diagnoses and all orders for this visit:    1. Upper airway cough syndrome (Primary)  -Office well-controlled secondary to postnasal drip.  She has Singulair and Claritin use on a regular basis.  If she needs to she can use Claritin-D or Benadryl on a as needed basis.  She verbalized understanding agree with the plan.  Singulair refilled on today's visit.      Follow Up:   Return if symptoms worsen or fail to improve.       DAVID Hargrove DO  Pulmonary and Critical Care Medicine  Note Electronically Signed    Part of  this note may be an electronic transcription/translation of spoken language to printed text using the Dragon Dictation System.

## 2024-11-14 ENCOUNTER — OFFICE VISIT (OUTPATIENT)
Dept: NEUROSURGERY | Facility: CLINIC | Age: 64
End: 2024-11-14
Payer: COMMERCIAL

## 2024-11-14 VITALS
WEIGHT: 130.3 LBS | BODY MASS INDEX: 21.71 KG/M2 | SYSTOLIC BLOOD PRESSURE: 124 MMHG | HEIGHT: 65 IN | DIASTOLIC BLOOD PRESSURE: 80 MMHG

## 2024-11-14 DIAGNOSIS — D32.9 MENINGIOMA: ICD-10-CM

## 2024-11-14 DIAGNOSIS — D33.0 BENIGN NEOPLASM OF SUPRATENTORIAL REGION OF BRAIN: Primary | ICD-10-CM

## 2024-11-14 DIAGNOSIS — R51.9 CHRONIC INTRACTABLE HEADACHE, UNSPECIFIED HEADACHE TYPE: ICD-10-CM

## 2024-11-14 DIAGNOSIS — G89.29 CHRONIC INTRACTABLE HEADACHE, UNSPECIFIED HEADACHE TYPE: ICD-10-CM

## 2024-11-14 NOTE — PROGRESS NOTES
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  Unfortunately she had a little dragging of her right foot and fell and hit her head without loss of consciousness, she underwent CT scan of the head which noted a right frontal calcified meningioma.  The patient presents today for neurosurgical evaluation with an MRI of the brain.    Chronic Illnesses:    Past Medical History:   Diagnosis Date    Cancer     Skin    Depression     Headache May 2024    Last 6 months    Heart murmur 05/10/2023    Hyperlipidemia 2023    Hypertension 2016    Impaired glucose tolerance     MRSA (methicillin resistant Staphylococcus aureus)     12 years ago- thumb    LENA (obstructive sleep apnea) 2023    Raised TSH level     Vitamin D deficiency 2023         Past Surgical History:   Procedure Laterality Date    BASAL CELL CARCINOMA EXCISION       SECTION      CHOLECYSTECTOMY N/A 2022    EPIDURAL BLOCK  1994    Birth of child 30 years    HYSTERECTOMY N/A        Allergies   Allergen Reactions    Penicillins Rash         Current Outpatient Medications:     amLODIPine (NORVASC) 5 MG tablet, 1 tablet Daily., Disp: , Rfl:     aspirin 81 MG EC tablet, Take 1 tablet by mouth Daily., Disp: , Rfl:     Biotin w/ Vitamins C & E (Hair/Skin/Nails) 1250-7.5-7.5 MCG-MG-UNT chewable tablet, , Disp: , Rfl:     Cholecalciferol (Vitamin D3) 1.25 MG (64973 UT) capsule, , Disp: , Rfl:     Dexilant 60 MG capsule, 1 capsule Daily., Disp: , Rfl:     DULoxetine (CYMBALTA) 60 MG capsule, 1 capsule Daily., Disp: , Rfl:     hydroCHLOROthiazide (HYDRODIURIL) 25 MG tablet, TAKE ONE TABLET BY MOUTH DAILY, Disp: 30 tablet, Rfl: 3     montelukast (SINGULAIR) 10 MG tablet, Take 1 tablet by mouth Every Night., Disp: 30 tablet, Rfl: 0    Motegrity 2 MG tablet, Take 1 tablet by mouth Daily., Disp: , Rfl:     Multiple Vitamins-Minerals (CENTRUM VITAMINTS PO), , Disp: , Rfl:     Omega-3 Fatty Acids (fish oil) 1000 MG capsule capsule, Take 1 capsule by mouth Daily With Breakfast., Disp: , Rfl:     Semaglutide-Weight Management (Wegovy) 1 MG/0.5ML solution auto-injector, Inject 0.5 mL under the skin into the appropriate area as directed 1 (One) Time Per Week., Disp: 2 mL, Rfl: 2    spironolactone (ALDACTONE) 50 MG tablet, 1 tablet Daily., Disp: , Rfl:     Current Facility-Administered Medications:     cyanocobalamin injection 1,000 mcg, 1,000 mcg, Intramuscular, Q28 Days, Marya Yoder APRN, 1,000 mcg at 05/10/23 1441    Social History     Socioeconomic History    Marital status:    Tobacco Use    Smoking status: Never     Passive exposure: Never    Smokeless tobacco: Never   Vaping Use    Vaping status: Never Used   Substance and Sexual Activity    Alcohol use: Yes     Alcohol/week: 1.0 standard drink of alcohol     Types: 1 Glasses of wine per week     Comment: Monthly if that    Drug use: Never    Sexual activity: Yes     Partners: Male     Birth control/protection: Condom     Comment: Hysterectomy       Family History   Problem Relation Age of Onset    Cancer Mother             Hypertension Father             Heart disease Father     Heart attack Father             Diabetes Brother     Hypertension Brother     Obesity Brother     Diabetes Brother     Hypertension Brother     Thyroid disease Sister        BMI is within normal parameters. No other follow-up for BMI required.       Social History    Tobacco Use      Smoking status: Never        Passive exposure: Never      Smokeless tobacco: Never       Review of Systems   HENT:  Positive for tinnitus.    Eyes:  Positive for visual disturbance.   Musculoskeletal:   "Positive for gait problem.   Neurological:  Positive for dizziness and headaches.        Gait & Balance Assessment:  Risk assessment for falls. Fall precautions:  such as;   Using gait aids a cane, walker at the appropriate height at all times for ambulation or if necessary a wheelchair  Removing all area rugs and coffee tables to create a safe environment at home  Ensure clean, dry floors  Wearing supportive footwear and properly fitting clothing  Ensure bed/chair is appropriate height and patient's feet can touch the floor  Using a shower transfer bench  Using walk-in shower and having shower safety bars installed  Ensure proper lighting, minimize glare  Have nightlights operational and in use  Participation in an exercise program for gait training, balance training and strength  Avoid carrying laundry up and down steps  Ensure proper compliance and organization of medications to avoid errors   Avoid use of over the counter sedatives and alcohol consumption  Ensure easy access to call bell, glasses, TV control, telephone  Ensure glasses/hearing aids are in use or close by (on top of night table)     Physical examination:  Blood pressure 124/80, height 165.1 cm (65\"), weight 59.1 kg (130 lb 4.8 oz).  HEENT- normocephalic, atraumatic, sclera clear  Lungs-normal expansion, no wheezing  Heart-regular rate and rhythm  Extremities-positive pulses, no edema    Neurological Exam   WDWN WF  A/A/C, speech clear, attention normal, conversant, answers questions appropriately, good historian.  Cranial nerves II through XII are intact.  Motor examination does not reveal weakness in the upper or lower extremities.   Sensation is intact.  Gait is normal, balance is normal.   No tremors are noted.    Radiographic Imaging:  For my review CT scan of the head, 10/25/2024 which shows a 13 mm right frontal densely calcified meningioma, no evidence of hemorrhage, ventricular size is normal.  There is a fluid level in the right maxillary " sinus.  MRI of the brain, 11/13/2024, is reviewed and shows a right frontal calcified meningioma, no ventriculomegaly, no other masses or hemorrhage noted.    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 Meningioma    3. Chronic intractable headache, unspecified headache type    Other orders  -     MRI outside films; Future       This is a 64-year-old female who is having blurry vision first thing in the morning which then clears and normal rest of the day, high-frequency hearing loss in both ears ringing in the ears and headaches.  She noted she would occasionally drag her right foot and approximately 2 or 3 weeks ago she fell and hit her head which led to a CT scan showing a calcified meningioma.  She ultimately had an MRI of the brain yesterday which shows for a right frontal calcified meningioma.  For the last couple years she has gone through multiple medical issues from a GI  aspect, she lost a considerable amount of weight.  She reports that she is eating well and that she is drinking her fluids through the day.  Her blood pressures have been stable, she has seen cardiology and off blood pressure medications.  Her blood pressure today is stable at 124/80.  If her headaches were to persist and become more bothersome she should see neurology.  We have discussed the hearing loss and the ringing in her ears and I think she should see ENT and audiology, she will get those appointments arranged herself.  From a neurosurgical perspective we would repeat a brain MRI scan in 1 year.  She will continue to monitor her gait and balance and if she were to have further concerns I would have her be evaluated by physical therapy.      Any copied data from previous notes included in the (1) HPI, (2) PE, (3) MDM and/or assessment and plan has been reviewed and is accurate as of this  margoth.    Luz Valerio, PAC    Patient Care Team:  Kenya Benz MD as PCP - General (Internal Medicine & Pediatrics)  Romero Gordon MD as Consulting Physician (Endocrinology)  Oskar Hargrove DO as Consulting Physician (Pulmonary Disease)

## 2024-11-15 ENCOUNTER — TELEPHONE (OUTPATIENT)
Dept: NEUROSURGERY | Facility: CLINIC | Age: 64
End: 2024-11-15
Payer: COMMERCIAL

## 2024-11-15 NOTE — TELEPHONE ENCOUNTER
[facility] received a fax that requires your attention. The document has been indexed to the patient’s chart for your review.      Reason for sending: NOTIFICATION OF RECORDS    Documents Description: MRI BRAIN W/WO 11/13/24    Name of Sender: [facility]    Date Indexed: 11/15/24    Notes (if needed): FOR REVIEW

## 2024-12-16 ENCOUNTER — OFFICE VISIT (OUTPATIENT)
Dept: ENDOCRINOLOGY | Facility: CLINIC | Age: 64
End: 2024-12-16
Payer: COMMERCIAL

## 2024-12-16 VITALS
HEIGHT: 65 IN | BODY MASS INDEX: 20.66 KG/M2 | DIASTOLIC BLOOD PRESSURE: 76 MMHG | SYSTOLIC BLOOD PRESSURE: 120 MMHG | HEART RATE: 75 BPM | WEIGHT: 124 LBS | OXYGEN SATURATION: 100 %

## 2024-12-16 DIAGNOSIS — R73.03 PREDIABETES: Primary | ICD-10-CM

## 2024-12-16 DIAGNOSIS — R94.6 ABNORMAL THYROID FUNCTION TEST: ICD-10-CM

## 2024-12-16 LAB
EXPIRATION DATE: NORMAL
EXPIRATION DATE: NORMAL
GLUCOSE BLDC GLUCOMTR-MCNC: 92 MG/DL (ref 70–130)
HBA1C MFR BLD: 5.2 % (ref 4.5–5.7)
Lab: NORMAL
Lab: NORMAL

## 2024-12-16 PROCEDURE — 99213 OFFICE O/P EST LOW 20 MIN: CPT | Performed by: INTERNAL MEDICINE

## 2024-12-16 PROCEDURE — 82947 ASSAY GLUCOSE BLOOD QUANT: CPT | Performed by: INTERNAL MEDICINE

## 2024-12-16 PROCEDURE — 83036 HEMOGLOBIN GLYCOSYLATED A1C: CPT | Performed by: INTERNAL MEDICINE

## 2024-12-16 RX ORDER — SULFAMETHOXAZOLE AND TRIMETHOPRIM 800; 160 MG/1; MG/1
TABLET ORAL
COMMUNITY
Start: 2024-12-12

## 2024-12-16 RX ORDER — PREDNISONE 20 MG/1
TABLET ORAL
COMMUNITY
Start: 2024-12-12

## 2024-12-16 NOTE — PROGRESS NOTES
"     Office Note      Date: 2024  Patient Name: Sarah Sanchez  MRN: [id number]  : 1960    Chief Complaint   Patient presents with    Prediabetes       History of Present Illness:   Sarah Sanchez is a 64 y.o. female who presents for Prediabetes    She isn't taking any thyroid meds. She denies any excess iodine intake. She hasn't noted any change in the size of her neck. She denies any compressive sxs. She denies any symptoms of hypo- or hyperthyroidism at this time.  She isn't taking biotin.     She hasn't had any episodes of hypoglycemia recently. She isn't doing FSBS.  She is on wegovy 1mg sq q week through weight management clinic.      Subjective      Review of Systems:   Review of Systems   Constitutional: Negative.    Cardiovascular: Negative.    Gastrointestinal: Negative.    Endocrine: Negative.        The following portions of the patient's history were reviewed and updated as appropriate: allergies, current medications, past family history, past medical history, past social history, past surgical history, and problem list.    Objective     Visit Vitals  /76   Pulse 75   Ht 165.1 cm (65\")   Wt 56.2 kg (124 lb)   SpO2 100%   BMI 20.63 kg/m²       Physical Exam:  Physical Exam  Constitutional:       Appearance: Normal appearance.   Neck:      Thyroid: No thyroid mass, thyromegaly or thyroid tenderness.   Musculoskeletal:      Cervical back: Normal range of motion.   Lymphadenopathy:      Cervical: No cervical adenopathy.   Neurological:      Mental Status: She is alert.         Labs:    TSH  No results found for: \"TSHBASE\"     Free T4  Free T4   Date Value Ref Range Status   2023 0.98 0.93 - 1.70 ng/dL Final       T3  No results found for: \"I0WRFDS\"      TPO  No results found for: \"THYROIDAB\"    TG AB  No results found for: \"THGAB\"    TG  No results found for: \"THYROGLB\"    CBC w/DIFF  Lab Results   Component Value Date    WBC 5.4 2023    RBC 4.95 2023    HGB " 14.4 03/30/2023    HCT 42.3 03/30/2023    MCV 86 03/30/2023    MCH 29.1 03/30/2023    MCHC 34.0 03/30/2023    RDW 12.6 03/30/2023     03/30/2023           Assessment / Plan      Assessment & Plan:  Diagnoses and all orders for this visit:    1. Prediabetes (Primary)  Assessment & Plan:  A1c looks good at 5.2%.  Continue to work on diet/exercise.    Orders:  -     POC Glucose, Blood  -     POC Glycosylated Hemoglobin (Hb A1C)    2. Abnormal thyroid function test  Assessment & Plan:  Recent TSH was normal at 3.92.  Continue to monitor labs.        Current Outpatient Medications   Medication Instructions    amLODIPine (NORVASC) 5 mg, Daily    aspirin 81 mg, Daily    Dexilant 60 mg, Daily    DULoxetine (CYMBALTA) 60 mg, Daily    fish oil 1,000 mg, Daily With Breakfast    hydroCHLOROthiazide (HYDRODIURIL) 25 MG tablet TAKE ONE TABLET BY MOUTH DAILY    montelukast (SINGULAIR) 10 mg, Oral, Nightly    Motegrity 2 mg, Daily    Multiple Vitamins-Minerals (CENTRUM VITAMINTS PO) No dose, route, or frequency recorded.    predniSONE (DELTASONE) 20 MG tablet     spironolactone (ALDACTONE) 50 mg, Daily    sulfamethoxazole-trimethoprim (BACTRIM DS,SEPTRA DS) 800-160 MG per tablet     Wegovy 1 mg, Subcutaneous, Weekly      Return in about 1 year (around 12/16/2025) for Recheck with A1c, TSH.    Electronically signed by: Romero Gordon MD  12/16/2024

## 2025-02-14 DIAGNOSIS — E66.9 OBESITY WITH SERIOUS COMORBIDITY, UNSPECIFIED CLASS, UNSPECIFIED OBESITY TYPE: ICD-10-CM

## 2025-02-17 RX ORDER — SEMAGLUTIDE 1 MG/.5ML
INJECTION, SOLUTION SUBCUTANEOUS
Qty: 2 ML | Refills: 0 | Status: SHIPPED | OUTPATIENT
Start: 2025-02-17

## 2025-03-14 DIAGNOSIS — E66.9 OBESITY WITH SERIOUS COMORBIDITY, UNSPECIFIED CLASS, UNSPECIFIED OBESITY TYPE: ICD-10-CM

## 2025-03-14 RX ORDER — SEMAGLUTIDE 1 MG/.5ML
INJECTION, SOLUTION SUBCUTANEOUS
Qty: 2 ML | Refills: 2 | Status: SHIPPED | OUTPATIENT
Start: 2025-03-14

## 2025-03-31 ENCOUNTER — OFFICE VISIT (OUTPATIENT)
Dept: BARIATRICS/WEIGHT MGMT | Facility: CLINIC | Age: 65
End: 2025-03-31
Payer: COMMERCIAL

## 2025-03-31 VITALS
WEIGHT: 126 LBS | SYSTOLIC BLOOD PRESSURE: 118 MMHG | HEIGHT: 65 IN | BODY MASS INDEX: 20.99 KG/M2 | DIASTOLIC BLOOD PRESSURE: 78 MMHG | HEART RATE: 79 BPM

## 2025-03-31 DIAGNOSIS — E78.5 HYPERLIPIDEMIA, UNSPECIFIED HYPERLIPIDEMIA TYPE: ICD-10-CM

## 2025-03-31 DIAGNOSIS — I10 BENIGN ESSENTIAL HYPERTENSION: ICD-10-CM

## 2025-03-31 DIAGNOSIS — E66.9 OBESITY WITH SERIOUS COMORBIDITY, UNSPECIFIED CLASS, UNSPECIFIED OBESITY TYPE: Primary | ICD-10-CM

## 2025-03-31 PROCEDURE — 99214 OFFICE O/P EST MOD 30 MIN: CPT | Performed by: NURSE PRACTITIONER

## 2025-03-31 NOTE — ASSESSMENT & PLAN NOTE
Hypertension is stable and controlled, improved with weight loss.   Continue current treatment regimen.  Weight loss.  Regular aerobic exercise.  Blood pressure will be reassessed in 3 months.

## 2025-03-31 NOTE — PROGRESS NOTES
[facility]  [address]  [place] [address]     Office Note      Date: 2025  Patient Name: Sarah Sanchez  MRN: [id number]  : 1960  Subjective  Subjective     Chief Complaint  Obesity Management follow-up          Sarah Sanchez presents to [facility] for obesity management. She is in maintenance, her obesity is in remission.  Patient is satisfied with weight loss progress. Appetite is well controlled. Reports no side effects of prescribed medications today. The patient is taking multivitamin and is taking fish oil.  The patient is not using a food journal.      Since her last visit she has been diagnosed with a benign brain tumor.  She is doing well, being mindful of her choices.     24 hour recall:  Breakfast: none (usually has protein shake)  Lunch: green beans, grilled chicken, salad w/cheese no dressing  Dinner: oranges  Snack: none  Water Intake: 64 oz  Other beverages: 1/2 sweet, 1/2 unsweet tea, diet coke 2 weekly  Alcohol: once monthly glass of wine    The patient is exercising walking and free weights with a FITT score of:    Frequency Intensity Time Strength Training   []   0, none []   0 []   0 []   0   [x]   1 (1-2x/week) []   1 (light) []   1 (<10 min) []   1 (1x/week)   []   2 (3-5x/week) [x]   2 (moderate) []   2 (10-20 min) [x]   2 (2x/week)   []   3 (daily) []   3 (moderately hard)  []   4 (very hard) [x]   3 (20-30 min)  []   4 (>30 min) []   3 (3-4x/week)             Review of Systems   Constitutional:  Negative for appetite change and fatigue.   Eyes:  Negative for visual disturbance.   Cardiovascular:  Negative for chest pain and palpitations.   Gastrointestinal:  Negative for constipation and indigestion.   Neurological:  Negative for light-headedness.         Current Outpatient Medications:     amLODIPine (NORVASC) 5 MG tablet, 1 tablet Daily., Disp: , Rfl:     aspirin 81 MG EC tablet, Take 1  "tablet by mouth Daily., Disp: , Rfl:     Dexilant 60 MG capsule, 1 capsule Daily., Disp: , Rfl:     DULoxetine (CYMBALTA) 60 MG capsule, 1 capsule Daily., Disp: , Rfl:     hydroCHLOROthiazide (HYDRODIURIL) 25 MG tablet, TAKE ONE TABLET BY MOUTH DAILY, Disp: 30 tablet, Rfl: 3    montelukast (SINGULAIR) 10 MG tablet, Take 1 tablet by mouth Every Night., Disp: 30 tablet, Rfl: 0    Motegrity 2 MG tablet, Take 1 tablet by mouth Daily., Disp: , Rfl:     Multiple Vitamins-Minerals (CENTRUM VITAMINTS PO), , Disp: , Rfl:     Omega-3 Fatty Acids (fish oil) 1000 MG capsule capsule, Take 1 capsule by mouth Daily With Breakfast., Disp: , Rfl:     Semaglutide-Weight Management (Wegovy) 1 MG/0.5ML solution auto-injector, INJECT 1 MG UNDER THE SKIN ONCE WEEKLY, Disp: 2 mL, Rfl: 2    spironolactone (ALDACTONE) 50 MG tablet, 1 tablet Daily., Disp: , Rfl:     Objective   Start weight: 161 pounds.    Total weight loss: -35 pounds/-21.73%  Change in weight since last visit: +4lb    Body mass index is 20.97 kg/m².   Body composition analysis completed and showed:   Body Fat %: 34.5    Measurements (in inches)  Waist Circumference: 35      Vital Signs:   /78 (BP Location: Left arm, Patient Position: Sitting)   Pulse 79   Ht 165.1 cm (65\")   Wt 57.2 kg (126 lb)   BMI 20.97 kg/m²     No LMP recorded.    Physical Exam   General appears stated age and normal appearance   HEENT PERRLA, EOM intact, and conjunctivae normal   Chest/lungs ERIK grade 2/6, Normal rate, Regular rhythm, and Breathing is unlabored   Extremities without edema   Neuro Good historian and No focal deficit   Skin Warm, dry, intact   Psych normal behavior, normal thought content, and normal concentration     Result Review :   The following data was reviewed by: KWAME Scales on 03/31/2025:  External labwork with PCP (01/2025):   CMP, CBC, TSH WNL.  which is slightly higher.              Assessment / Plan        Diagnoses and all orders for this " visit:    1. Obesity with serious comorbidity, unspecified class, unspecified obesity type (Primary)  Assessment & Plan:  In remission. History of class 1 obesity with co-existing HTN, HLD, preDM.   Con't current treatment- wegovy 1mg weekly and at least 150 minutes a week of moderate intensity exercise with 2 resistance training sessions weekly with diet low in saturated fat and refined carbs.   She is at her treatment goal and appetite is well controlled. Work on increasing protein to minimum of 57g/day.   Follow up in 3 mos.         2. Benign essential hypertension  Assessment & Plan:  Hypertension is stable and controlled, improved with weight loss.   Continue current treatment regimen.  Weight loss.  Regular aerobic exercise.  Blood pressure will be reassessed in 3 months.      3. Hyperlipidemia, unspecified hyperlipidemia type  Assessment & Plan:  Stable, . Followed by PCP. Con't diet low in saturated fat, regular aerobic exercise, and maintain healthy weight.             Follow Up   Return in about 3 months (around 6/30/2025) for Next scheduled follow up.  Patient was given instructions and counseling regarding her condition or for health maintenance advice. Please see specific information pulled into the AVS if appropriate.     Marya Yoder, KWAME  03/31/2025

## 2025-03-31 NOTE — ASSESSMENT & PLAN NOTE
Stable, . Followed by PCP. Con't diet low in saturated fat, regular aerobic exercise, and maintain healthy weight.

## 2025-03-31 NOTE — ASSESSMENT & PLAN NOTE
In remission. History of class 1 obesity with co-existing HTN, HLD, preDM.   Con't current treatment- wegovy 1mg weekly and at least 150 minutes a week of moderate intensity exercise with 2 resistance training sessions weekly with diet low in saturated fat and refined carbs.   She is at her treatment goal and appetite is well controlled. Work on increasing protein to minimum of 57g/day.   Follow up in 3 mos.

## 2025-04-01 ENCOUNTER — PRIOR AUTHORIZATION (OUTPATIENT)
Dept: BARIATRICS/WEIGHT MGMT | Facility: CLINIC | Age: 65
End: 2025-04-01
Payer: COMMERCIAL

## 2025-04-01 NOTE — TELEPHONE ENCOUNTER
Sarah Sanchez (Chan: [id number])  Wegovy 1MG/0.5ML auto-injectors  Form  [facility] Electronic PA Form (2017 [employer])      APPROVED- this request is approved from 04/02/2025 to 04/02/2026      Patient nofied

## 2025-06-06 DIAGNOSIS — E66.9 OBESITY WITH SERIOUS COMORBIDITY, UNSPECIFIED CLASS, UNSPECIFIED OBESITY TYPE: ICD-10-CM

## 2025-06-09 RX ORDER — SEMAGLUTIDE 1 MG/.5ML
1 INJECTION, SOLUTION SUBCUTANEOUS WEEKLY
Qty: 2 ML | Refills: 2 | Status: SHIPPED | OUTPATIENT
Start: 2025-06-09

## 2025-07-21 ENCOUNTER — OFFICE VISIT (OUTPATIENT)
Dept: BARIATRICS/WEIGHT MGMT | Facility: CLINIC | Age: 65
End: 2025-07-21
Payer: COMMERCIAL

## 2025-07-21 VITALS
BODY MASS INDEX: 20.33 KG/M2 | WEIGHT: 122 LBS | DIASTOLIC BLOOD PRESSURE: 72 MMHG | HEART RATE: 67 BPM | SYSTOLIC BLOOD PRESSURE: 100 MMHG | HEIGHT: 65 IN

## 2025-07-21 DIAGNOSIS — E66.9 OBESITY WITH SERIOUS COMORBIDITY, UNSPECIFIED CLASS, UNSPECIFIED OBESITY TYPE: Primary | ICD-10-CM

## 2025-07-21 DIAGNOSIS — R73.03 PREDIABETES: ICD-10-CM

## 2025-07-21 DIAGNOSIS — I10 BENIGN ESSENTIAL HYPERTENSION: ICD-10-CM

## 2025-07-21 PROCEDURE — 99214 OFFICE O/P EST MOD 30 MIN: CPT | Performed by: NURSE PRACTITIONER

## 2025-07-21 RX ORDER — ACETAMINOPHEN 160 MG
TABLET,DISINTEGRATING ORAL
COMMUNITY

## 2025-07-21 NOTE — ASSESSMENT & PLAN NOTE
In remission. Denies hypoglycemia. Continue low carb diet, regular physical activity, and maintain weight reduction of 10-15%. Con't wegovy.

## 2025-07-21 NOTE — ASSESSMENT & PLAN NOTE
Hypertension is stable and controlled. Running low normal today. Denies dizziness Counseled on orthostatic hypotension and possible need to increase sodium in diet.   Continue current treatment regimen.  Regular aerobic exercise.  Ambulatory blood pressure monitoring.  Blood pressure will be reassessed in 3 months.

## 2025-07-21 NOTE — ASSESSMENT & PLAN NOTE
In remission. History of class 1 obesity with co-existing HTN, HLD, preDM.   Con't current treatment- wegovy 1mg weekly and at least 150 minutes a week of moderate intensity exercise with 2 resistance training sessions weekly with diet low in saturated fat and refined carbs.   She is at her treatment goal and appetite is well controlled. Doesn't want to lose below 120lb.   Again: Work on increasing protein to minimum of 57g/day. At least 3 palms a day.   Follow up in 3 mos. Getting labs w/ PCP in August, will send those to me.

## 2025-07-21 NOTE — PROGRESS NOTES
[facility]  [address]  [place] [address]     Office Note      Date: 2025  Patient Name: Sarah Sanchez  MRN: [id number]  : 1960  Subjective  Subjective     Chief Complaint  Obesity Management follow-up          Sarah Sanchez presents to [facility] for obesity management.   Patient is satisfied with weight loss progress. Appetite is moderately controlled. Reports no side effects of prescribed medications today. The patient is taking multivitamin and is taking fish oil.  The patient is not using a food journal. She knows she is not meeting goal with her protein intake, otherwise she is doing well, being mindful of her choices. Just returned from vacation and ate a lot of shrimp and chicken, plans to be more intentional with protein intake.     24 hour recall:  Breakfast: Protein shake  Lunch: none  Dinner: chicken breast, baked potato w/ butter, salad w/ cheese and bl[place]-no dressing, roll  Snack: handful of nuts  Water Intake: 48 oz  Other beverages: 1/2 and 1/2 tea  Alcohol: occasional glass of wine    The patient is exercising walking, weight training with a FITT score of:    Frequency Intensity Time Strength Training   []   0, none []   0 []   0 []   0   [x]   1 (1-2x/week) []   1 (light) []   1 (<10 min) []   1 (1x/week)   []   2 (3-5x/week) [x]   2 (moderate) []   2 (10-20 min) [x]   2 (2x/week)   []   3 (daily) []   3 (moderately hard)  []   4 (very hard) [x]   3 (20-30 min)  []   4 (>30 min) []   3 (3-4x/week)               Review of Systems   Constitutional:  Negative for appetite change and fatigue.   Eyes:  Negative for visual disturbance.   Cardiovascular:  Negative for chest pain and palpitations.   Gastrointestinal:  Negative for constipation and indigestion.   Neurological:  Negative for light-headedness.         Current Outpatient Medications:     amLODIPine (NORVASC) 5 MG tablet, 1 tablet Daily.,  "Disp: , Rfl:     aspirin 81 MG EC tablet, Take 1 tablet by mouth Daily., Disp: , Rfl:     Dexilant 60 MG capsule, 1 capsule Daily., Disp: , Rfl:     DULoxetine (CYMBALTA) 60 MG capsule, 1 capsule Daily., Disp: , Rfl:     hydroCHLOROthiazide (HYDRODIURIL) 25 MG tablet, TAKE ONE TABLET BY MOUTH DAILY, Disp: 30 tablet, Rfl: 3    montelukast (SINGULAIR) 10 MG tablet, Take 1 tablet by mouth Every Night., Disp: 30 tablet, Rfl: 0    Motegrity 2 MG tablet, Take 1 tablet by mouth Daily., Disp: , Rfl:     Multiple Vitamins-Minerals (CENTRUM VITAMINTS PO), , Disp: , Rfl:     Omega-3 Fatty Acids (fish oil) 1000 MG capsule capsule, Take 1 capsule by mouth Daily With Breakfast., Disp: , Rfl:     spironolactone (ALDACTONE) 50 MG tablet, 1 tablet Daily., Disp: , Rfl:     Wegovy 1 MG/0.5ML solution auto-injector, INJECT 1 MG UNDER THE SKIN ONCE WEEKLY, Disp: 2 mL, Rfl: 2    Cholecalciferol (Vitamin D3) 50 MCG (2000 UT) capsule, , Disp: , Rfl:     Objective   Start weight: 161 pounds.    Total weight loss: -39 pounds/-24.22%  Change in weight since last visit: -4lb    Body mass index is 20.3 kg/m².   Body composition analysis completed and showed:   Body Fat %: 33.9    Measurements (in inches)  Waist Circumference: 31      Vital Signs:   /72 (BP Location: Left arm, Patient Position: Sitting)   Pulse 67   Ht 165.1 cm (65\")   Wt 55.3 kg (122 lb)   BMI 20.30 kg/m²     No LMP recorded.    Physical Exam   General appears stated age and normal appearance   HEENT PERRLA, EOM intact, and conjunctivae normal   Chest/lungs Normal rate, Regular rhythm, and Breathing is unlabored   Extremities without edema   Neuro Good historian and No focal deficit   Skin Warm, dry, intact   Psych normal behavior, normal thought content, and normal concentration     Result Review :                Assessment / Plan        Diagnoses and all orders for this visit:    1. Obesity with serious comorbidity, unspecified class, unspecified obesity type " (Primary)  Assessment & Plan:  In remission. History of class 1 obesity with co-existing HTN, HLD, preDM.   Con't current treatment- wegovy 1mg weekly and at least 150 minutes a week of moderate intensity exercise with 2 resistance training sessions weekly with diet low in saturated fat and refined carbs.   She is at her treatment goal and appetite is well controlled. Doesn't want to lose below 120lb.   Again: Work on increasing protein to minimum of 57g/day. At least 3 palms a day.   Follow up in 3 mos. Getting labs w/ PCP in August, will send those to me.          2. Benign essential hypertension  Assessment & Plan:  Hypertension is stable and controlled. Running low normal today. Denies dizziness Counseled on orthostatic hypotension and possible need to increase sodium in diet.   Continue current treatment regimen.  Regular aerobic exercise.  Ambulatory blood pressure monitoring.  Blood pressure will be reassessed in 3 months.      3. Prediabetes  Overview:  01/2024 fasting glucose 103, A1c 5.0 on wegovy    Assessment & Plan:  In remission. Denies hypoglycemia. Continue low carb diet, regular physical activity, and maintain weight reduction of 10-15%. Con't wegovy.               ICD-10-CM ICD-9-CM   1. Obesity with serious comorbidity, unspecified class, unspecified obesity type  E66.9 278.00   2. Benign essential hypertension  I10 401.1   3. Prediabetes  R73.03 790.29          Follow Up   No follow-ups on file.  Patient was given instructions and counseling regarding her condition or for health maintenance advice. Please see specific information pulled into the AVS if appropriate.     Marya Yoder, APRN  07/21/2025